# Patient Record
Sex: MALE | Race: WHITE | Employment: PART TIME | URBAN - METROPOLITAN AREA
[De-identification: names, ages, dates, MRNs, and addresses within clinical notes are randomized per-mention and may not be internally consistent; named-entity substitution may affect disease eponyms.]

---

## 2019-04-17 ENCOUNTER — ANESTHESIA EVENT (OUTPATIENT)
Dept: ENDOSCOPY | Age: 79
End: 2019-04-17
Payer: MEDICARE

## 2019-04-17 ENCOUNTER — HOSPITAL ENCOUNTER (OUTPATIENT)
Age: 79
Setting detail: OUTPATIENT SURGERY
Discharge: HOME | End: 2019-04-17
Attending: INTERNAL MEDICINE | Admitting: INTERNAL MEDICINE
Payer: MEDICARE

## 2019-04-17 ENCOUNTER — ANESTHESIA (OUTPATIENT)
Dept: ENDOSCOPY | Age: 79
End: 2019-04-17
Payer: MEDICARE

## 2019-04-17 VITALS
OXYGEN SATURATION: 96 % | DIASTOLIC BLOOD PRESSURE: 68 MMHG | WEIGHT: 164.3 LBS | HEIGHT: 67 IN | BODY MASS INDEX: 25.79 KG/M2 | RESPIRATION RATE: 16 BRPM | SYSTOLIC BLOOD PRESSURE: 112 MMHG | TEMPERATURE: 97.4 F | HEART RATE: 62 BPM

## 2019-04-17 VITALS
DIASTOLIC BLOOD PRESSURE: 84 MMHG | SYSTOLIC BLOOD PRESSURE: 171 MMHG | OXYGEN SATURATION: 92 % | RESPIRATION RATE: 13 BRPM | TEMPERATURE: 98.6 F

## 2019-04-17 PROCEDURE — 7100000000 HC PACU RECOVERY - FIRST 15 MIN: Performed by: INTERNAL MEDICINE

## 2019-04-17 PROCEDURE — 3700000000 HC ANESTHESIA ATTENDED CARE: Performed by: INTERNAL MEDICINE

## 2019-04-17 PROCEDURE — 88305 TISSUE EXAM BY PATHOLOGIST: CPT

## 2019-04-17 PROCEDURE — 7100000001 HC PACU RECOVERY - ADDTL 15 MIN: Performed by: INTERNAL MEDICINE

## 2019-04-17 PROCEDURE — 7100000010 HC PHASE II RECOVERY - FIRST 15 MIN: Performed by: INTERNAL MEDICINE

## 2019-04-17 PROCEDURE — 87081 CULTURE SCREEN ONLY: CPT

## 2019-04-17 PROCEDURE — 7100000011 HC PHASE II RECOVERY - ADDTL 15 MIN: Performed by: INTERNAL MEDICINE

## 2019-04-17 PROCEDURE — 2709999900 HC NON-CHARGEABLE SUPPLY: Performed by: INTERNAL MEDICINE

## 2019-04-17 PROCEDURE — 3609012400 HC EGD TRANSORAL BIOPSY SINGLE/MULTIPLE: Performed by: INTERNAL MEDICINE

## 2019-04-17 PROCEDURE — 6360000002 HC RX W HCPCS: Performed by: NURSE ANESTHETIST, CERTIFIED REGISTERED

## 2019-04-17 PROCEDURE — 3609013500 HC EGD REMOVAL TUMOR POLYP/OTHER LESION SNARE TECH: Performed by: INTERNAL MEDICINE

## 2019-04-17 PROCEDURE — 2580000003 HC RX 258: Performed by: ANESTHESIOLOGY

## 2019-04-17 PROCEDURE — 2500000003 HC RX 250 WO HCPCS: Performed by: NURSE ANESTHETIST, CERTIFIED REGISTERED

## 2019-04-17 PROCEDURE — 3609012700 HC EGD DILATION SAVORY: Performed by: INTERNAL MEDICINE

## 2019-04-17 PROCEDURE — 3700000001 HC ADD 15 MINUTES (ANESTHESIA): Performed by: INTERNAL MEDICINE

## 2019-04-17 PROCEDURE — 2720000010 HC SURG SUPPLY STERILE: Performed by: INTERNAL MEDICINE

## 2019-04-17 RX ORDER — SODIUM CHLORIDE 0.9 % (FLUSH) 0.9 %
10 SYRINGE (ML) INJECTION PRN
Status: DISCONTINUED | OUTPATIENT
Start: 2019-04-17 | End: 2019-04-17 | Stop reason: HOSPADM

## 2019-04-17 RX ORDER — MIDAZOLAM HYDROCHLORIDE 1 MG/ML
INJECTION INTRAMUSCULAR; INTRAVENOUS PRN
Status: DISCONTINUED | OUTPATIENT
Start: 2019-04-17 | End: 2019-04-17 | Stop reason: SDUPTHER

## 2019-04-17 RX ORDER — SODIUM CHLORIDE 0.9 % (FLUSH) 0.9 %
10 SYRINGE (ML) INJECTION EVERY 12 HOURS SCHEDULED
Status: DISCONTINUED | OUTPATIENT
Start: 2019-04-17 | End: 2019-04-17 | Stop reason: HOSPADM

## 2019-04-17 RX ORDER — PROPOFOL 10 MG/ML
INJECTION, EMULSION INTRAVENOUS PRN
Status: DISCONTINUED | OUTPATIENT
Start: 2019-04-17 | End: 2019-04-17 | Stop reason: SDUPTHER

## 2019-04-17 RX ORDER — FENTANYL CITRATE 50 UG/ML
INJECTION, SOLUTION INTRAMUSCULAR; INTRAVENOUS PRN
Status: DISCONTINUED | OUTPATIENT
Start: 2019-04-17 | End: 2019-04-17 | Stop reason: SDUPTHER

## 2019-04-17 RX ORDER — SODIUM CHLORIDE, SODIUM LACTATE, POTASSIUM CHLORIDE, CALCIUM CHLORIDE 600; 310; 30; 20 MG/100ML; MG/100ML; MG/100ML; MG/100ML
INJECTION, SOLUTION INTRAVENOUS CONTINUOUS
Status: DISCONTINUED | OUTPATIENT
Start: 2019-04-17 | End: 2019-04-17 | Stop reason: HOSPADM

## 2019-04-17 RX ORDER — LIDOCAINE HYDROCHLORIDE 20 MG/ML
INJECTION, SOLUTION INFILTRATION; PERINEURAL PRN
Status: DISCONTINUED | OUTPATIENT
Start: 2019-04-17 | End: 2019-04-17 | Stop reason: SDUPTHER

## 2019-04-17 RX ADMIN — MIDAZOLAM 2 MG: 1 INJECTION INTRAMUSCULAR; INTRAVENOUS at 07:22

## 2019-04-17 RX ADMIN — PROPOFOL 50 MG: 10 INJECTION, EMULSION INTRAVENOUS at 07:25

## 2019-04-17 RX ADMIN — FENTANYL CITRATE 50 MCG: 50 INJECTION, SOLUTION INTRAMUSCULAR; INTRAVENOUS at 07:24

## 2019-04-17 RX ADMIN — PROPOFOL 70 MG: 10 INJECTION, EMULSION INTRAVENOUS at 07:29

## 2019-04-17 RX ADMIN — FENTANYL CITRATE 50 MCG: 50 INJECTION, SOLUTION INTRAMUSCULAR; INTRAVENOUS at 07:22

## 2019-04-17 RX ADMIN — LIDOCAINE HYDROCHLORIDE 10 MG: 20 INJECTION, SOLUTION INFILTRATION; PERINEURAL at 07:24

## 2019-04-17 RX ADMIN — SODIUM CHLORIDE, POTASSIUM CHLORIDE, SODIUM LACTATE AND CALCIUM CHLORIDE: 600; 310; 30; 20 INJECTION, SOLUTION INTRAVENOUS at 06:28

## 2019-04-17 RX ADMIN — PROPOFOL 20 MG: 10 INJECTION, EMULSION INTRAVENOUS at 07:24

## 2019-04-17 RX ADMIN — PROPOFOL 30 MG: 10 INJECTION, EMULSION INTRAVENOUS at 07:41

## 2019-04-17 ASSESSMENT — PAIN - FUNCTIONAL ASSESSMENT: PAIN_FUNCTIONAL_ASSESSMENT: 0-10

## 2019-04-17 ASSESSMENT — PAIN SCALES - GENERAL
PAINLEVEL_OUTOF10: 0

## 2019-04-17 NOTE — PROGRESS NOTES
Slight difficulty with esophageal intubation, some slight stridor upon awakening, patient sent to PACU per CRNA. [facility] notified.
nail polish on your fingers or toes. 11. DO NOT wear any jewelry or piercings on day of surgery. All body piercing jewelry must be removed. 12. Shower the night before surgery with _x__Antibacterial soap /PHAN WIPES________    13. TOTAL JOINT REPLACEMENT/HYSTERECTOMY PATIENTS ONLY---Remember to bring Blood Bank bracelet to the hospital on the day of surgery. 14. If you have a Living Will and Durable Power of  for Healthcare, please bring in a copy. 15. If appropriate bring crutches, inspirex, WALKER, CANE etc... 12. Notify your Surgeon if you develop any illness between now and surgery time, cough, cold, fever, sore throat, nausea, vomiting, etc.  Please notify your surgeon if you experience dizziness, shortness of breath or blurred vision between now & the time of your surgery. 17. If you have ___dentures, they will be removed before going to the OR; we will provide you a container. If you wear ___contact lenses or ___glasses, they will be removed; please bring a case for them. 18. To provide excellent care visitors will be limited to 2 in the room at any given time. 19. Please bring picture ID and insurance card. 20. Sleep apnea patients need to bring CPAP AND SETTINGS to hospital on day of surgery. 21. During flu season no children under the age of 15 are permitted in the hospital for the safety of all patients. 22. Other                 Please call AMBULATORY CARE if you have any further questions.    [address] [facility]     [address]

## 2019-04-17 NOTE — H&P
H&p reviewed. No changes. Blood pressure (!) 143/67, pulse 58, temperature 96 °F (35.6 °C), temperature source Temporal, resp. rate 16, height 5' 7\" (1.702 m), weight 164 lb 4.8 oz (74.5 kg), SpO2 97 %.

## 2019-04-17 NOTE — ANESTHESIA PRE PROCEDURE
Department of Anesthesiology  Preprocedure Note       Name:  Shauna Tirado   Age:  70 y.o.  :  1940                                          MRN:  [id number]         Date:  2019      Surgeon: Abdirizak Dan):  Javad Astorga MD    Procedure: EGD ESOPHAGOGASTRODUODENOSCOPY (N/A )    Medications prior to admission:   Prior to Admission medications    Not on File       Current medications:    Current Facility-Administered Medications   Medication Dose Route Frequency Provider Last Rate Last Dose    sodium chloride flush 0.9 % injection 10 mL  10 mL Intravenous 2 times per day Javad Astorga MD        sodium chloride flush 0.9 % injection 10 mL  10 mL Intravenous PRN Javad Astorga MD        lactated ringers infusion   Intravenous Continuous Iftikhar Wheatley MD 10 mL/hr at 19 5931         Allergies:  No Known Allergies    Problem List:    Patient Active Problem List   Diagnosis Code    Right cataract H26.9       Past Medical History:        Diagnosis Date    Gout     Torn rotator cuff 2008? left       Past Surgical History:        Procedure Laterality Date    CATARACT REMOVAL WITH IMPLANT Right 2016    COLONOSCOPY      neg    FRACTURE SURGERY Left     thumb    TONSILLECTOMY      UPPER GASTROINTESTINAL ENDOSCOPY      small hiatal hernia       Social History:    Social History     Tobacco Use    Smoking status: Never Smoker    Smokeless tobacco: Never Used    Tobacco comment: smoked cigars 20 years ago on occasion   Substance Use Topics    Alcohol use:  Yes     Alcohol/week: 2.4 oz     Types: 4 Glasses of wine per week     Comment: Daily wine                                Counseling given: Not Answered  Comment: smoked cigars 20 years ago on occasion      Vital Signs (Current):   Vitals:    19 0935 19 0610   BP:  (!) 143/67   Pulse:  58   Resp:  16   Temp:  96 °F (35.6 °C)   TempSrc:  Temporal   SpO2:  97%   Weight: 160 lb (72.6 kg) 164 lb 4.8 oz (74.5 kg)   Height: 5' 7\" (1.702 m) 5' 7\" (1.702 m)                                              BP Readings from Last 3 Encounters:   04/17/19 (!) 143/67   06/06/17 (!) 177/56   05/03/17 (!) 156/76       NPO Status: Time of last liquid consumption: 2100                        Time of last solid consumption: 1900                        Date of last liquid consumption: 04/16/19                        Date of last solid food consumption: 04/16/19    BMI:   Wt Readings from Last 3 Encounters:   04/17/19 164 lb 4.8 oz (74.5 kg)   06/06/17 170 lb (77.1 kg)   05/03/17 174 lb 3 oz (79 kg)     Body mass index is 25.73 kg/m². CBC:   Lab Results   Component Value Date    WBC 4.3 11/21/2014    RBC 4.16 11/21/2014    HGB 13.7 11/21/2014    HCT 40.9 11/21/2014    MCV 98.4 11/21/2014    RDW 14.7 11/21/2014     11/21/2014       CMP:   Lab Results   Component Value Date     11/21/2014    K 4.1 11/21/2014     11/21/2014    CO2 22 11/21/2014    BUN 10 11/21/2014    CREATININE 1.0 11/21/2014    GFRAA >60 11/21/2014    LABGLOM >60 11/21/2014    GLUCOSE 93 11/21/2014    GLUCOSE 85 04/01/2011    PROT 6.7 11/21/2014    CALCIUM 9.0 11/21/2014    BILITOT 0.2 11/21/2014    ALKPHOS 72 11/21/2014    AST 17 11/21/2014    ALT 11 11/21/2014       POC Tests: No results for input(s): POCGLU, POCNA, POCK, POCCL, POCBUN, POCHEMO, POCHCT in the last 72 hours.     Coags: No results found for: PROTIME, INR, APTT    HCG (If Applicable): No results found for: PREGTESTUR, PREGSERUM, HCG, HCGQUANT     ABGs: No results found for: PHART, PO2ART, EYW8NET, NRL2AMD, BEART, U1RRFCPW     Type & Screen (If Applicable):  No results found for: LABABO, [facility]    Anesthesia Evaluation  Patient summary reviewed no history of anesthetic complications:   Airway: Mallampati: II  TM distance: >3 FB   Neck ROM: full  Mouth opening: > = 3 FB Dental: normal exam         Pulmonary:Negative Pulmonary ROS breath sounds clear to

## 2019-04-17 NOTE — OP NOTE
[address]                                OPERATIVE REPORT    PATIENT NAME: Aimee Sotomayor                    :        1940  MED REC NO:   [id number]                            ROOM:  ACCOUNT NO:   [de-identified]                           ADMIT DATE: 2019  PROVIDER:     Kishan Mcelroy MD    DATE OF PROCEDURE:  2019    PREOPERATIVE DIAGNOSIS:  Dysphagia. POSTOPERATIVE DIAGNOSES:  GERD, gastritis. OPERATION PERFORMED:  EGD, biopsies, esophageal dilatation. SURGEON:  Kishan Mcelroy MD    INDICATIONS:  The patient is 61-year-old. Had difficulty with  swallowing. ASA classification II. Informed consent. SEDATION:  By Anesthesia. OPERATIVE PROCEDURE:  The Olympus video upper endoscope was introduced  through the mouth. Difficult intubation through the hypopharynx. The  esophagus, the stomach, and the proximal duodenum were inspected. Exam  of the fundus by retroflexion. Hiatus hernia with erosive mucosal  changes in the lower esophagus, biopsies, CLOtest for H. pylori. Multiple acute ulcers in the gastric antrum. Biopsies, guidewire, a  small polyp in the upper stomach, excised with the forceps. Guidewire  passed through the endoscope into the stomach. Endoscope withdrawn. Wire in place and the Savary dilator size 16 mm was advanced over the  wire and the esophagus was dilated. Dilator and wire were withdrawn  together. Secretions suctioned. Shaji Clinton MD    D: 2019 7:52:28       T: 2019 8:49:25     [email or web address]  Job#: [id number]     Doc#: [id number]    CC:   Kamila Morse DO

## 2019-04-17 NOTE — ANESTHESIA POSTPROCEDURE EVALUATION
Department of Anesthesiology  Postprocedure Note    Patient: He Blair  MRN: [id number]  YOB: 1940  Date of evaluation: 4/17/2019  Time:  11:29 AM     Procedure Summary     Date:  04/17/19 Room / Location:  [address] ENDOSCOPY    Anesthesia Start:  3975 Anesthesia Stop:      Procedures:       EGD DILATION SAVORY (N/A )      EGD BIOPSY      EGD POLYP SNARE Diagnosis:  (DYSPHAGIA)    Surgeon:  Sola Magaña MD Responsible Provider:  Marquis Coleman MD    Anesthesia Type:  MAC ASA Status:  2          Anesthesia Type: MAC    Joo Phase I: Joo Score: 10    Joo Phase II: Joo Score: 10    Last vitals: Reviewed and per EMR flowsheets.        Anesthesia Post Evaluation    Patient location during evaluation: PACU  Patient participation: complete - patient participated  Level of consciousness: awake and alert  Airway patency: patent  Nausea & Vomiting: no vomiting and no nausea  Complications: no  Cardiovascular status: hemodynamically stable  Respiratory status: acceptable  Hydration status: stable

## 2019-04-18 LAB — CLOTEST: NORMAL

## 2020-02-13 LAB
TSH SERPL DL<=0.05 MIU/L-ACNC: NORMAL M[IU]/L
URIC ACID: NORMAL

## 2021-02-02 LAB
ALBUMIN SERPL-MCNC: NORMAL G/DL
ALP BLD-CCNC: NORMAL U/L
ALT SERPL-CCNC: NORMAL U/L
ANION GAP SERPL CALCULATED.3IONS-SCNC: NORMAL MMOL/L
AST SERPL-CCNC: NORMAL U/L
BILIRUB SERPL-MCNC: NORMAL MG/DL
BUN BLDV-MCNC: NORMAL MG/DL
CALCIUM SERPL-MCNC: NORMAL MG/DL
CHLORIDE BLD-SCNC: NORMAL MMOL/L
CO2: NORMAL
CREAT SERPL-MCNC: NORMAL MG/DL
GFR CALCULATED: NORMAL
GLUCOSE BLD-MCNC: NORMAL MG/DL
LIPASE: NORMAL
POTASSIUM SERPL-SCNC: NORMAL MMOL/L
SODIUM BLD-SCNC: NORMAL MMOL/L
TOTAL PROTEIN: NORMAL
URIC ACID: NORMAL

## 2021-02-05 ENCOUNTER — APPOINTMENT (OUTPATIENT)
Dept: INTERVENTIONAL RADIOLOGY/VASCULAR | Age: 81
DRG: 314 | End: 2021-02-05
Payer: MEDICARE

## 2021-02-05 ENCOUNTER — APPOINTMENT (OUTPATIENT)
Dept: GENERAL RADIOLOGY | Age: 81
DRG: 314 | End: 2021-02-05
Payer: MEDICARE

## 2021-02-05 ENCOUNTER — HOSPITAL ENCOUNTER (INPATIENT)
Age: 81
LOS: 4 days | Discharge: ANOTHER HOSPITAL | DRG: 314 | End: 2021-02-09
Attending: EMERGENCY MEDICINE | Admitting: INTERNAL MEDICINE
Payer: MEDICARE

## 2021-02-05 ENCOUNTER — APPOINTMENT (OUTPATIENT)
Dept: CT IMAGING | Age: 81
DRG: 314 | End: 2021-02-05
Payer: MEDICARE

## 2021-02-05 DIAGNOSIS — F41.9 ANXIETY: ICD-10-CM

## 2021-02-05 DIAGNOSIS — J90 PLEURAL EFFUSION, BILATERAL: Primary | ICD-10-CM

## 2021-02-05 DIAGNOSIS — R09.02 HYPOXIA: ICD-10-CM

## 2021-02-05 LAB
ALBUMIN FLUID: 1.1 G/DL
ALBUMIN SERPL-MCNC: 3.4 G/DL (ref 3.5–5.2)
ALP BLD-CCNC: 73 U/L (ref 40–129)
ALT SERPL-CCNC: 29 U/L (ref 0–40)
AMMONIA: 17 UMOL/L (ref 16–60)
AMYLASE FLUID: 16 U/L
ANION GAP SERPL CALCULATED.3IONS-SCNC: 10 MMOL/L (ref 7–16)
APPEARANCE FLUID: NORMAL
AST SERPL-CCNC: 25 U/L (ref 0–39)
BACTERIA: NORMAL /HPF
BILIRUB SERPL-MCNC: 0.5 MG/DL (ref 0–1.2)
BILIRUBIN DIRECT: <0.2 MG/DL (ref 0–0.3)
BILIRUBIN URINE: NEGATIVE
BILIRUBIN, INDIRECT: ABNORMAL MG/DL (ref 0–1)
BLOOD, URINE: ABNORMAL
BUN BLDV-MCNC: 24 MG/DL (ref 8–23)
CALCIUM SERPL-MCNC: 9.2 MG/DL (ref 8.6–10.2)
CELL COUNT FLUID TYPE: NORMAL
CHLORIDE BLD-SCNC: 108 MMOL/L (ref 98–107)
CLARITY: CLEAR
CO2: 23 MMOL/L (ref 22–29)
COLOR FLUID: YELLOW
COLOR: YELLOW
CREAT SERPL-MCNC: 1.2 MG/DL (ref 0.7–1.2)
EKG ATRIAL RATE: 78 BPM
EKG P AXIS: 38 DEGREES
EKG P-R INTERVAL: 160 MS
EKG Q-T INTERVAL: 414 MS
EKG QRS DURATION: 116 MS
EKG QTC CALCULATION (BAZETT): 471 MS
EKG R AXIS: -56 DEGREES
EKG T AXIS: 82 DEGREES
EKG VENTRICULAR RATE: 78 BPM
EPITHELIAL CELLS, UA: NORMAL /HPF
FLUID TYPE: NORMAL
GFR AFRICAN AMERICAN: >60
GFR NON-AFRICAN AMERICAN: 59 ML/MIN/1.73
GLUCOSE BLD-MCNC: 114 MG/DL (ref 74–99)
GLUCOSE URINE: NEGATIVE MG/DL
GLUCOSE, FLUID: 117 MG/DL
HCT VFR BLD CALC: 45.6 % (ref 37–54)
HEMOGLOBIN: 14.7 G/DL (ref 12.5–16.5)
INFLUENZA A BY PCR: NOT DETECTED
INFLUENZA B BY PCR: NOT DETECTED
KETONES, URINE: NEGATIVE MG/DL
LACTIC ACID: 2.1 MMOL/L (ref 0.5–2.2)
LD, FLUID: 54 U/L
LEUKOCYTE ESTERASE, URINE: NEGATIVE
MCH RBC QN AUTO: 32.5 PG (ref 26–35)
MCHC RBC AUTO-ENTMCNC: 32.2 % (ref 32–34.5)
MCV RBC AUTO: 100.7 FL (ref 80–99.9)
MONOCYTE, FLUID: 83 %
NEUTROPHIL, FLUID: 17 %
NITRITE, URINE: NEGATIVE
NUCLEATED CELLS FLUID: 239 /UL
PDW BLD-RTO: 14 FL (ref 11.5–15)
PH UA: 5 (ref 5–9)
PLATELET # BLD: 225 E9/L (ref 130–450)
PMV BLD AUTO: 11.2 FL (ref 7–12)
POTASSIUM SERPL-SCNC: 5.4 MMOL/L (ref 3.5–5)
PRO-BNP: ABNORMAL PG/ML (ref 0–450)
PROTEIN FLUID: 1.4 G/DL
PROTEIN UA: NEGATIVE MG/DL
RBC # BLD: 4.53 E12/L (ref 3.8–5.8)
RBC FLUID: <2000 /UL
RBC UA: NORMAL /HPF (ref 0–2)
REASON FOR REJECTION: NORMAL
REJECTED TEST: NORMAL
SARS-COV-2, NAAT: NOT DETECTED
SODIUM BLD-SCNC: 141 MMOL/L (ref 132–146)
SPECIFIC GRAVITY UA: 1.01 (ref 1–1.03)
TOTAL PROTEIN: 5.7 G/DL (ref 6.4–8.3)
TROPONIN: <0.01 NG/ML (ref 0–0.03)
UROBILINOGEN, URINE: 0.2 E.U./DL
WBC # BLD: 8.1 E9/L (ref 4.5–11.5)
WBC UA: NORMAL /HPF (ref 0–5)

## 2021-02-05 PROCEDURE — 93005 ELECTROCARDIOGRAM TRACING: CPT | Performed by: EMERGENCY MEDICINE

## 2021-02-05 PROCEDURE — 83605 ASSAY OF LACTIC ACID: CPT

## 2021-02-05 PROCEDURE — 80048 BASIC METABOLIC PNL TOTAL CA: CPT

## 2021-02-05 PROCEDURE — 6360000004 HC RX CONTRAST MEDICATION: Performed by: RADIOLOGY

## 2021-02-05 PROCEDURE — 83880 ASSAY OF NATRIURETIC PEPTIDE: CPT

## 2021-02-05 PROCEDURE — 71045 X-RAY EXAM CHEST 1 VIEW: CPT

## 2021-02-05 PROCEDURE — 80076 HEPATIC FUNCTION PANEL: CPT

## 2021-02-05 PROCEDURE — 87040 BLOOD CULTURE FOR BACTERIA: CPT

## 2021-02-05 PROCEDURE — 2580000003 HC RX 258: Performed by: INTERNAL MEDICINE

## 2021-02-05 PROCEDURE — 82150 ASSAY OF AMYLASE: CPT

## 2021-02-05 PROCEDURE — 36415 COLL VENOUS BLD VENIPUNCTURE: CPT

## 2021-02-05 PROCEDURE — 94640 AIRWAY INHALATION TREATMENT: CPT

## 2021-02-05 PROCEDURE — 82947 ASSAY GLUCOSE BLOOD QUANT: CPT

## 2021-02-05 PROCEDURE — 0W993ZZ DRAINAGE OF RIGHT PLEURAL CAVITY, PERCUTANEOUS APPROACH: ICD-10-PCS | Performed by: RADIOLOGY

## 2021-02-05 PROCEDURE — 85027 COMPLETE CBC AUTOMATED: CPT

## 2021-02-05 PROCEDURE — 82140 ASSAY OF AMMONIA: CPT

## 2021-02-05 PROCEDURE — 6370000000 HC RX 637 (ALT 250 FOR IP): Performed by: EMERGENCY MEDICINE

## 2021-02-05 PROCEDURE — 6370000000 HC RX 637 (ALT 250 FOR IP): Performed by: INTERNAL MEDICINE

## 2021-02-05 PROCEDURE — U0002 COVID-19 LAB TEST NON-CDC: HCPCS

## 2021-02-05 PROCEDURE — 6360000002 HC RX W HCPCS: Performed by: INTERNAL MEDICINE

## 2021-02-05 PROCEDURE — 82042 OTHER SOURCE ALBUMIN QUAN EA: CPT

## 2021-02-05 PROCEDURE — 86140 C-REACTIVE PROTEIN: CPT

## 2021-02-05 PROCEDURE — 87502 INFLUENZA DNA AMP PROBE: CPT

## 2021-02-05 PROCEDURE — 1200000000 HC SEMI PRIVATE

## 2021-02-05 PROCEDURE — 84484 ASSAY OF TROPONIN QUANT: CPT

## 2021-02-05 PROCEDURE — 32555 ASPIRATE PLEURA W/ IMAGING: CPT

## 2021-02-05 PROCEDURE — 70450 CT HEAD/BRAIN W/O DYE: CPT

## 2021-02-05 PROCEDURE — 88305 TISSUE EXAM BY PATHOLOGIST: CPT

## 2021-02-05 PROCEDURE — 2700000000 HC OXYGEN THERAPY PER DAY

## 2021-02-05 PROCEDURE — 84157 ASSAY OF PROTEIN OTHER: CPT

## 2021-02-05 PROCEDURE — 71275 CT ANGIOGRAPHY CHEST: CPT

## 2021-02-05 PROCEDURE — 99285 EMERGENCY DEPT VISIT HI MDM: CPT

## 2021-02-05 PROCEDURE — 88112 CYTOPATH CELL ENHANCE TECH: CPT

## 2021-02-05 PROCEDURE — 89051 BODY FLUID CELL COUNT: CPT

## 2021-02-05 PROCEDURE — 81001 URINALYSIS AUTO W/SCOPE: CPT

## 2021-02-05 PROCEDURE — C1729 CATH, DRAINAGE: HCPCS

## 2021-02-05 PROCEDURE — 83615 LACTATE (LD) (LDH) ENZYME: CPT

## 2021-02-05 RX ORDER — IPRATROPIUM BROMIDE AND ALBUTEROL SULFATE 2.5; .5 MG/3ML; MG/3ML
1 SOLUTION RESPIRATORY (INHALATION)
Status: DISCONTINUED | OUTPATIENT
Start: 2021-02-05 | End: 2021-02-05

## 2021-02-05 RX ORDER — FUROSEMIDE 10 MG/ML
20 INJECTION INTRAMUSCULAR; INTRAVENOUS DAILY
Status: DISCONTINUED | OUTPATIENT
Start: 2021-02-05 | End: 2021-02-07

## 2021-02-05 RX ORDER — POLYETHYLENE GLYCOL 3350 17 G/17G
17 POWDER, FOR SOLUTION ORAL DAILY PRN
Status: DISCONTINUED | OUTPATIENT
Start: 2021-02-05 | End: 2021-02-09 | Stop reason: HOSPADM

## 2021-02-05 RX ORDER — ALLOPURINOL 100 MG/1
100 TABLET ORAL DAILY
Status: DISCONTINUED | OUTPATIENT
Start: 2021-02-05 | End: 2021-02-09 | Stop reason: HOSPADM

## 2021-02-05 RX ORDER — ACETAMINOPHEN 325 MG/1
650 TABLET ORAL EVERY 6 HOURS PRN
Status: DISCONTINUED | OUTPATIENT
Start: 2021-02-05 | End: 2021-02-09 | Stop reason: HOSPADM

## 2021-02-05 RX ORDER — ONDANSETRON 2 MG/ML
4 INJECTION INTRAMUSCULAR; INTRAVENOUS EVERY 6 HOURS PRN
Status: DISCONTINUED | OUTPATIENT
Start: 2021-02-05 | End: 2021-02-09 | Stop reason: HOSPADM

## 2021-02-05 RX ORDER — SODIUM CHLORIDE 0.9 % (FLUSH) 0.9 %
10 SYRINGE (ML) INJECTION EVERY 12 HOURS SCHEDULED
Status: DISCONTINUED | OUTPATIENT
Start: 2021-02-05 | End: 2021-02-09 | Stop reason: HOSPADM

## 2021-02-05 RX ORDER — ALLOPURINOL 100 MG/1
100 TABLET ORAL DAILY
COMMUNITY

## 2021-02-05 RX ORDER — GAUZE BANDAGE 2" X 2"
100 BANDAGE TOPICAL DAILY
Status: DISCONTINUED | OUTPATIENT
Start: 2021-02-05 | End: 2021-02-09 | Stop reason: HOSPADM

## 2021-02-05 RX ORDER — 0.9 % SODIUM CHLORIDE 0.9 %
1000 INTRAVENOUS SOLUTION INTRAVENOUS ONCE
Status: DISCONTINUED | OUTPATIENT
Start: 2021-02-05 | End: 2021-02-09 | Stop reason: HOSPADM

## 2021-02-05 RX ORDER — ONDANSETRON 4 MG/1
4 TABLET, ORALLY DISINTEGRATING ORAL EVERY 8 HOURS PRN
Status: DISCONTINUED | OUTPATIENT
Start: 2021-02-05 | End: 2021-02-09 | Stop reason: HOSPADM

## 2021-02-05 RX ORDER — LORAZEPAM 0.5 MG/1
0.5 TABLET ORAL EVERY 4 HOURS PRN
Status: DISCONTINUED | OUTPATIENT
Start: 2021-02-05 | End: 2021-02-09 | Stop reason: HOSPADM

## 2021-02-05 RX ORDER — PANTOPRAZOLE SODIUM 40 MG/1
40 TABLET, DELAYED RELEASE ORAL
Status: DISCONTINUED | OUTPATIENT
Start: 2021-02-06 | End: 2021-02-09 | Stop reason: HOSPADM

## 2021-02-05 RX ORDER — IPRATROPIUM BROMIDE AND ALBUTEROL SULFATE 2.5; .5 MG/3ML; MG/3ML
1 SOLUTION RESPIRATORY (INHALATION) EVERY 4 HOURS
Status: DISCONTINUED | OUTPATIENT
Start: 2021-02-05 | End: 2021-02-09 | Stop reason: HOSPADM

## 2021-02-05 RX ORDER — SODIUM CHLORIDE 0.9 % (FLUSH) 0.9 %
10 SYRINGE (ML) INJECTION PRN
Status: DISCONTINUED | OUTPATIENT
Start: 2021-02-05 | End: 2021-02-09 | Stop reason: HOSPADM

## 2021-02-05 RX ORDER — ACETAMINOPHEN 650 MG/1
650 SUPPOSITORY RECTAL EVERY 6 HOURS PRN
Status: DISCONTINUED | OUTPATIENT
Start: 2021-02-05 | End: 2021-02-09 | Stop reason: HOSPADM

## 2021-02-05 RX ORDER — OMEPRAZOLE 10 MG/1
10 CAPSULE, DELAYED RELEASE ORAL DAILY
Status: ON HOLD | COMMUNITY
End: 2021-02-05

## 2021-02-05 RX ORDER — TAMSULOSIN HYDROCHLORIDE 0.4 MG/1
0.4 CAPSULE ORAL DAILY
Status: ON HOLD | COMMUNITY
End: 2021-02-05

## 2021-02-05 RX ADMIN — IPRATROPIUM BROMIDE AND ALBUTEROL SULFATE 1 AMPULE: .5; 3 SOLUTION RESPIRATORY (INHALATION) at 12:28

## 2021-02-05 RX ADMIN — ENOXAPARIN SODIUM 30 MG: 30 INJECTION SUBCUTANEOUS at 18:38

## 2021-02-05 RX ADMIN — FUROSEMIDE 20 MG: 10 INJECTION, SOLUTION INTRAMUSCULAR; INTRAVENOUS at 19:42

## 2021-02-05 RX ADMIN — IOPAMIDOL 75 ML: 755 INJECTION, SOLUTION INTRAVENOUS at 13:38

## 2021-02-05 RX ADMIN — SACUBITRIL AND VALSARTAN 1 TABLET: 24; 26 TABLET, FILM COATED ORAL at 19:49

## 2021-02-05 RX ADMIN — THIAMINE HCL TAB 100 MG 100 MG: 100 TAB at 21:32

## 2021-02-05 RX ADMIN — SODIUM CHLORIDE, PRESERVATIVE FREE 10 ML: 5 INJECTION INTRAVENOUS at 19:43

## 2021-02-05 RX ADMIN — ALLOPURINOL 100 MG: 100 TABLET ORAL at 18:38

## 2021-02-05 ASSESSMENT — ENCOUNTER SYMPTOMS
SHORTNESS OF BREATH: 1
COUGH: 1
CONSTIPATION: 0
NAUSEA: 0
ABDOMINAL PAIN: 0
DIARRHEA: 0
WHEEZING: 0
CHEST TIGHTNESS: 1
SORE THROAT: 0
VOMITING: 0

## 2021-02-05 NOTE — ED PROVIDER NOTES
Patient states he has been sick for the past 3 weeks intermittently with SOB. He states he was seen by his PCP and had labs drawn. He is a poor historian regarding the HPI. He denies chills, diaphoresis, congestion, S/T, n/v/d/c. He admits to chest tightness and SOB. He states there is no production    The history is provided by the patient. Shortness of Breath  Associated symptoms: cough    Associated symptoms: no abdominal pain, no chest pain, no diaphoresis, no sore throat, no vomiting and no wheezing        Review of Systems   Constitutional: Positive for activity change and fatigue. Negative for chills and diaphoresis. HENT: Negative for congestion and sore throat. Respiratory: Positive for cough, chest tightness and shortness of breath. Negative for wheezing. Cardiovascular: Negative for chest pain, palpitations and leg swelling. Gastrointestinal: Negative for abdominal pain, constipation, diarrhea, nausea and vomiting. Genitourinary: Negative. Musculoskeletal: Negative. Skin: Negative. Neurological: Negative. Physical Exam  Vitals signs and nursing note reviewed. Constitutional:       General: He is not in acute distress. Appearance: He is well-developed. He is ill-appearing. He is not toxic-appearing. HENT:      Head: Normocephalic and atraumatic. Mouth/Throat:      Mouth: Mucous membranes are moist.      Pharynx: Oropharynx is clear. No pharyngeal swelling. Eyes:      Pupils: Pupils are equal, round, and reactive to light. Neck:      Musculoskeletal: Normal range of motion and neck supple. Cardiovascular:      Rate and Rhythm: Normal rate and regular rhythm. Pulses: Normal pulses. No decreased pulses. Heart sounds: Normal heart sounds. No murmur. Pulmonary:      Effort: Tachypnea and respiratory distress present. Breath sounds: Examination of the right-upper field reveals decreased breath sounds.  Examination of the left-upper field reveals decreased breath sounds. Examination of the right-middle field reveals decreased breath sounds. Examination of the left-middle field reveals decreased breath sounds. Examination of the right-lower field reveals decreased breath sounds. Examination of the left-lower field reveals decreased breath sounds. Decreased breath sounds present. No wheezing or rales. Abdominal:      General: Bowel sounds are normal.      Palpations: Abdomen is soft. Tenderness: There is no abdominal tenderness. There is no guarding or rebound. Musculoskeletal: Normal range of motion. Right lower leg: He exhibits no tenderness. No edema. Left lower leg: He exhibits no tenderness. No edema. Skin:     General: Skin is warm and dry. Capillary Refill: Capillary refill takes less than 2 seconds. Coloration: Skin is not pale. Neurological:      General: No focal deficit present. Mental Status: He is alert and oriented to person, place, and time. Cranial Nerves: No cranial nerve deficit. Coordination: Coordination normal.   Psychiatric:         Mood and Affect: Mood normal.         Behavior: Behavior normal.         Procedures    [facility]    ED Course as of Feb 05 1441 Fri Feb 05, 2021   1257 I spoke with the patient's PCP. Patient is an alcoholic, drinking large amounts of wine daily. Patient's daughter told PCP that he has been more altered recently. 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  He was Covid negative.     [JS]      ED Course User Index  [JS] Jose Ocampo DO     EKG Interpretation    Interpreted by emergency department physician    Rhythm: normal sinus   Rate: normal  Axis: left  Ectopy: none  Conduction: normal  ST Segments: nonspecific changes  T Waves: inversion in  v2  Q Waves: II, III and aVf    Clinical Impression: no acute changes    Jose Ocampo  ED Course as of Feb 05 1441 Fri Feb 05, 2021 1257 I spoke with the patient's PCP. Patient is an alcoholic, drinking large amounts of wine daily. Patient's daughter told PCP that he has been more altered recently. 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  He was Covid negative. [JS]      ED Course User Index  [JS] Agus Gurrola, DO       --------------------------------------------- PAST HISTORY ---------------------------------------------  Past Medical History:  has a past medical history of Gout and Torn rotator cuff. Past Surgical History:  has a past surgical history that includes fracture surgery (Left, 1958); Tonsillectomy; Colonoscopy (2001); Upper gastrointestinal endoscopy (2001); Cataract removal with implant (Right, 11/02/2016); Upper gastrointestinal endoscopy (N/A, 4/17/2019); Upper gastrointestinal endoscopy (4/17/2019); and Upper gastrointestinal endoscopy (4/17/2019). Social History:  reports that he has never smoked. He has never used smokeless tobacco. He reports current alcohol use of about 4.0 standard drinks of alcohol per week. He reports that he does not use drugs. Family History: family history is not on file. The patients home medications have been reviewed. Allergies: Patient has no known allergies.     -------------------------------------------------- RESULTS -------------------------------------------------    Lab  Results for orders placed or performed during the hospital encounter of 02/05/21   Rapid influenza A/B antigens    Specimen: Nares   Result Value Ref Range    Influenza A by PCR Not Detected Not Detected    Influenza B by PCR Not Detected Not Detected   Basic metabolic panel   Result Value Ref Range    Sodium 141 132 - 146 mmol/L    Potassium 5.4 (H) 3.5 - 5.0 mmol/L    Chloride 108 (H) 98 - 107 mmol/L    CO2 23 22 - 29 mmol/L    Anion Gap 10 7 - 16 mmol/L    Glucose 114 (H) 74 - 99 mg/dL    BUN 24 (H) 8 - 23 mg/dL AM  ED Bed Assignment:  08/08    The nursing notes within the ED encounter and vital signs as below have been reviewed. Patient Vitals for the past 24 hrs:   BP Temp Pulse Resp SpO2 Weight   02/05/21 1359 (!) 128/93 -- 84 22 99 % --   02/05/21 1244 -- -- -- -- -- 165 lb (74.8 kg)   02/05/21 1228 -- -- -- 22 90 % --   02/05/21 1140 (!) 143/83 -- -- -- -- --   02/05/21 1138 -- -- -- 22 91 % --   02/05/21 1135 -- 96.9 °F (36.1 °C) 91 -- 98 % --       Oxygen Saturation Interpretation: Abnormal, improved on oxygen      ------------------------------------------ PROGRESS NOTES ------------------------------------------  Re-evaluation(s):  Time: 13:00. Patients symptoms show no change  Repeat physical examination is not changed    Time: 14:40. Patients symptoms show no change  Repeat physical examination is not changed    I have spoken with the patient and discussed todays results, in addition to providing specific details for the plan of care and counseling regarding the diagnosis and prognosis. Their questions are answered at this time and they are agreeable with the plan. Call placed to daughter, Ariana Palomo, message left for her to call.    --------------------------------- ADDITIONAL PROVIDER NOTES ---------------------------------  Consultations: We'll attempt to get patient over to interventional radiology for thoracentesis. Spoke with Dr. Ramirez Avina,  They will admit this patient. This patient's ED course included: a personal history and physicial examination and multiple bedside re-evaluations    This patient has been closely monitored during their ED course. Please note that the withdrawal or failure to initiate urgent interventions for this patient would likely result in a life threatening deterioration or permanent disability. Accordingly this patient received 0 minutes of critical care time, excluding separately billable procedures. Clinical Impression  1. Pleural effusion, bilateral    2. Hypoxia          Disposition  Patient's disposition: Admit to telemetry  Patient's condition is stable.        Emma Horn, DO  02/05/21 350 Laura Asher DO  02/05/21 150

## 2021-02-05 NOTE — ED NOTES
[phone]     Juan Carlos Echevarria, DEENA  02/05/21 1526

## 2021-02-05 NOTE — PROGRESS NOTES
Patient came down to Special Procedures for ultrasound guided right thoracentesis. 1619 Starting procedure 166/83 96 20 97% 3 liters nasal canula    1625 Ending procedure 124/98 92 20 100% 3 liters nasal canula    1500cc of yellow/straw color pleural fluid drained from patient, 2 x 2 and tegaderm applied to right upper back. Post procedure chest xray taken. Specimen sent with patient    Patient tolerated procedure.      Patient sent with ED

## 2021-02-06 LAB
ALBUMIN SERPL-MCNC: 3.6 G/DL (ref 3.5–5.2)
ALP BLD-CCNC: 74 U/L (ref 40–129)
ALT SERPL-CCNC: 25 U/L (ref 0–40)
ANION GAP SERPL CALCULATED.3IONS-SCNC: 11 MMOL/L (ref 7–16)
AST SERPL-CCNC: 24 U/L (ref 0–39)
BASOPHILS ABSOLUTE: 0.05 E9/L (ref 0–0.2)
BASOPHILS RELATIVE PERCENT: 0.5 % (ref 0–2)
BILIRUB SERPL-MCNC: 0.8 MG/DL (ref 0–1.2)
BUN BLDV-MCNC: 23 MG/DL (ref 8–23)
C-REACTIVE PROTEIN: 0.6 MG/DL (ref 0–0.4)
CALCIUM SERPL-MCNC: 9 MG/DL (ref 8.6–10.2)
CHLORIDE BLD-SCNC: 109 MMOL/L (ref 98–107)
CHOLESTEROL, TOTAL: 160 MG/DL (ref 0–199)
CO2: 23 MMOL/L (ref 22–29)
CREAT SERPL-MCNC: 1.3 MG/DL (ref 0.7–1.2)
EOSINOPHILS ABSOLUTE: 0.04 E9/L (ref 0.05–0.5)
EOSINOPHILS RELATIVE PERCENT: 0.4 % (ref 0–6)
GFR AFRICAN AMERICAN: >60
GFR NON-AFRICAN AMERICAN: 53 ML/MIN/1.73
GLUCOSE BLD-MCNC: 100 MG/DL (ref 74–99)
HBA1C MFR BLD: 5.7 % (ref 4–5.6)
HCT VFR BLD CALC: 45.2 % (ref 37–54)
HDLC SERPL-MCNC: 36 MG/DL
HEMOGLOBIN: 14.9 G/DL (ref 12.5–16.5)
IMMATURE GRANULOCYTES #: 0.02 E9/L
IMMATURE GRANULOCYTES %: 0.2 % (ref 0–5)
LDL CHOLESTEROL CALCULATED: 108 MG/DL (ref 0–99)
LYMPHOCYTES ABSOLUTE: 0.84 E9/L (ref 1.5–4)
LYMPHOCYTES RELATIVE PERCENT: 8.8 % (ref 20–42)
MAGNESIUM: 1.8 MG/DL (ref 1.6–2.6)
MCH RBC QN AUTO: 32.7 PG (ref 26–35)
MCHC RBC AUTO-ENTMCNC: 33 % (ref 32–34.5)
MCV RBC AUTO: 99.3 FL (ref 80–99.9)
MONOCYTES ABSOLUTE: 0.53 E9/L (ref 0.1–0.95)
MONOCYTES RELATIVE PERCENT: 5.6 % (ref 2–12)
NEUTROPHILS ABSOLUTE: 8.05 E9/L (ref 1.8–7.3)
NEUTROPHILS RELATIVE PERCENT: 84.5 % (ref 43–80)
PDW BLD-RTO: 13.8 FL (ref 11.5–15)
PHOSPHORUS: 3.5 MG/DL (ref 2.5–4.5)
PLATELET # BLD: 199 E9/L (ref 130–450)
PMV BLD AUTO: 11.4 FL (ref 7–12)
POTASSIUM SERPL-SCNC: 4.2 MMOL/L (ref 3.5–5)
PROSTATE SPECIFIC ANTIGEN: 1.14 NG/ML (ref 0–4)
RBC # BLD: 4.55 E12/L (ref 3.8–5.8)
SEDIMENTATION RATE, ERYTHROCYTE: 0 MM/HR (ref 0–15)
SODIUM BLD-SCNC: 143 MMOL/L (ref 132–146)
TOTAL PROTEIN: 5.8 G/DL (ref 6.4–8.3)
TRIGL SERPL-MCNC: 79 MG/DL (ref 0–149)
TSH SERPL DL<=0.05 MIU/L-ACNC: 1.76 UIU/ML (ref 0.27–4.2)
URIC ACID, SERUM: 9.8 MG/DL (ref 3.4–7)
VITAMIN D 25-HYDROXY: 30 NG/ML (ref 30–100)
VLDLC SERPL CALC-MCNC: 16 MG/DL
WBC # BLD: 9.5 E9/L (ref 4.5–11.5)

## 2021-02-06 PROCEDURE — 82607 VITAMIN B-12: CPT

## 2021-02-06 PROCEDURE — 94640 AIRWAY INHALATION TREATMENT: CPT

## 2021-02-06 PROCEDURE — 80053 COMPREHEN METABOLIC PANEL: CPT

## 2021-02-06 PROCEDURE — 84100 ASSAY OF PHOSPHORUS: CPT

## 2021-02-06 PROCEDURE — 2580000003 HC RX 258: Performed by: INTERNAL MEDICINE

## 2021-02-06 PROCEDURE — 86701 HIV-1ANTIBODY: CPT

## 2021-02-06 PROCEDURE — 85651 RBC SED RATE NONAUTOMATED: CPT

## 2021-02-06 PROCEDURE — 86702 HIV-2 ANTIBODY: CPT

## 2021-02-06 PROCEDURE — 80061 LIPID PANEL: CPT

## 2021-02-06 PROCEDURE — 86703 HIV-1/HIV-2 1 RESULT ANTBDY: CPT

## 2021-02-06 PROCEDURE — 82746 ASSAY OF FOLIC ACID SERUM: CPT

## 2021-02-06 PROCEDURE — 2700000000 HC OXYGEN THERAPY PER DAY

## 2021-02-06 PROCEDURE — 6370000000 HC RX 637 (ALT 250 FOR IP): Performed by: INTERNAL MEDICINE

## 2021-02-06 PROCEDURE — 83735 ASSAY OF MAGNESIUM: CPT

## 2021-02-06 PROCEDURE — 1200000000 HC SEMI PRIVATE

## 2021-02-06 PROCEDURE — 80074 ACUTE HEPATITIS PANEL: CPT

## 2021-02-06 PROCEDURE — 83036 HEMOGLOBIN GLYCOSYLATED A1C: CPT

## 2021-02-06 PROCEDURE — G0103 PSA SCREENING: HCPCS

## 2021-02-06 PROCEDURE — 36415 COLL VENOUS BLD VENIPUNCTURE: CPT

## 2021-02-06 PROCEDURE — 84550 ASSAY OF BLOOD/URIC ACID: CPT

## 2021-02-06 PROCEDURE — 82306 VITAMIN D 25 HYDROXY: CPT

## 2021-02-06 PROCEDURE — 85025 COMPLETE CBC W/AUTO DIFF WBC: CPT

## 2021-02-06 PROCEDURE — 99223 1ST HOSP IP/OBS HIGH 75: CPT | Performed by: INTERNAL MEDICINE

## 2021-02-06 PROCEDURE — 84443 ASSAY THYROID STIM HORMONE: CPT

## 2021-02-06 PROCEDURE — 6360000002 HC RX W HCPCS: Performed by: INTERNAL MEDICINE

## 2021-02-06 RX ORDER — METOPROLOL SUCCINATE 25 MG/1
25 TABLET, EXTENDED RELEASE ORAL DAILY
Status: DISCONTINUED | OUTPATIENT
Start: 2021-02-06 | End: 2021-02-09 | Stop reason: HOSPADM

## 2021-02-06 RX ORDER — COLCHICINE 0.6 MG/1
0.6 TABLET ORAL 2 TIMES DAILY
Status: DISCONTINUED | OUTPATIENT
Start: 2021-02-06 | End: 2021-02-09 | Stop reason: HOSPADM

## 2021-02-06 RX ADMIN — IPRATROPIUM BROMIDE AND ALBUTEROL SULFATE 1 AMPULE: .5; 3 SOLUTION RESPIRATORY (INHALATION) at 22:34

## 2021-02-06 RX ADMIN — METOPROLOL SUCCINATE 25 MG: 25 TABLET, EXTENDED RELEASE ORAL at 09:23

## 2021-02-06 RX ADMIN — SODIUM CHLORIDE, PRESERVATIVE FREE 10 ML: 5 INJECTION INTRAVENOUS at 22:26

## 2021-02-06 RX ADMIN — ALLOPURINOL 100 MG: 100 TABLET ORAL at 09:23

## 2021-02-06 RX ADMIN — FUROSEMIDE 20 MG: 10 INJECTION, SOLUTION INTRAMUSCULAR; INTRAVENOUS at 09:23

## 2021-02-06 RX ADMIN — ENOXAPARIN SODIUM 30 MG: 30 INJECTION SUBCUTANEOUS at 19:22

## 2021-02-06 RX ADMIN — THIAMINE HCL TAB 100 MG 100 MG: 100 TAB at 09:23

## 2021-02-06 RX ADMIN — LORAZEPAM 0.5 MG: 0.5 TABLET ORAL at 03:35

## 2021-02-06 RX ADMIN — IPRATROPIUM BROMIDE AND ALBUTEROL SULFATE 1 AMPULE: .5; 3 SOLUTION RESPIRATORY (INHALATION) at 06:10

## 2021-02-06 RX ADMIN — SODIUM CHLORIDE, PRESERVATIVE FREE 10 ML: 5 INJECTION INTRAVENOUS at 09:24

## 2021-02-06 RX ADMIN — IPRATROPIUM BROMIDE AND ALBUTEROL SULFATE 1 AMPULE: .5; 3 SOLUTION RESPIRATORY (INHALATION) at 09:27

## 2021-02-06 RX ADMIN — COLCHICINE 0.6 MG: 0.6 TABLET, FILM COATED ORAL at 22:26

## 2021-02-06 RX ADMIN — SACUBITRIL AND VALSARTAN 1 TABLET: 24; 26 TABLET, FILM COATED ORAL at 09:23

## 2021-02-06 RX ADMIN — PANTOPRAZOLE SODIUM 40 MG: 40 TABLET, DELAYED RELEASE ORAL at 06:10

## 2021-02-06 RX ADMIN — SACUBITRIL AND VALSARTAN 1 TABLET: 24; 26 TABLET, FILM COATED ORAL at 20:31

## 2021-02-06 RX ADMIN — IPRATROPIUM BROMIDE AND ALBUTEROL SULFATE 1 AMPULE: .5; 3 SOLUTION RESPIRATORY (INHALATION) at 13:19

## 2021-02-06 RX ADMIN — IPRATROPIUM BROMIDE AND ALBUTEROL SULFATE 1 AMPULE: .5; 3 SOLUTION RESPIRATORY (INHALATION) at 18:28

## 2021-02-06 ASSESSMENT — PAIN SCALES - GENERAL
PAINLEVEL_OUTOF10: 0
PAINLEVEL_OUTOF10: 0

## 2021-02-06 NOTE — H&P
[address]                              HISTORY AND PHYSICAL    PATIENT NAME: Jhon Sinclair                    :        1940  MED REC NO:   [id number]                            ROOM:       7593  ACCOUNT NO:   [de-identified]                           ADMIT DATE: 2021  PROVIDER:     Carson Mcgrath DO    CHIEF COMPLAINT AND HISTORY OF CHIEF COMPLAINT:  This is a pleasant  32-year-old white male who was admitted to [address] [place] [address]. The  patient presented to the hospital here through the emergency room on  2021. The patient states that approximately over the past three  weeks, he has been having intermittent shortness of breath and  difficulty breathing. The patient apparently presented to the hospital  here with increasing amount of shortness of breath. Radiographic  findings at this time did show moderate to large bilateral pleural  effusion with compressive atelectasis. The patient at this time was  seen and evaluated and admitted to the hospital under the service of Dr. Heber De Santiago and Dr. Betty Acuna. The patient was seen at this time. Exam at this  time revealed a 32-year-old white male who is alert and responsive,  resting relatively comfortable at the present time. From a cardiac  standpoint view, the patient currently denies any chest pain per se. He  does admit to have some exertional dyspnea. Radiographic findings at  this time did show abnormal CAT scan. The patient did undergo removal  of 1500 mL of fluid from his right lung. This was dark yellow. He  denies any prior cardiac history and his EKG did show a normal sinus  rhythm. He denies any prior cardiac history. He denies any swelling of  his lower extremities. He actually denies any orthopnea, but did have a  chronic nonproductive cough. He denies any fever or chills.   The only  thing, which is interesting to note at this time that he has recently  traveled to [place] for a conference. This was approximately three  weeks ago. He has been checked for COVID on several times, but so far  has been negative. His proBNP is elevated and he has no prior cardiac  history. Respiratory golden, he denies any fever or chills associated  with it. He does have a nonproductive cough. Gastrointestinal wise, he  denies any recent change in bowel habits, hematochezia, or rectal  bleeding, nausea, vomiting, diarrhea, or constipation. Genitourinary  wise, he does relate to having a frequency with urination and does have  nocturia x2 or 3. Otherwise, he denies any history of stroke, TIAs,  etc.  He did seem to be alert and responsive on my evaluation, although  in the emergency room they did think that he did have some underlying  memory impairment for which the CAT scan of the head was obtained. His  only other history which is significant is that he does drink wine on a  daily basis and two glasses which is approximately 8 to 10 ounces total.  He currently is improving at the present time. ALLERGIES:  He denies any known drug allergy. CURRENT MEDICATIONS:  Prior to admission include Zyloprim for gout and  occasional gastroesophageal reflux disease for which he has seen Dr. Nori Guzman. PAST MEDICAL HISTORY:  Positive for usual childhood diseases, history of  torn rotator cuff, history of gout. PAST SURGICAL HISTORY:  Includes cataract removal with implant,  colonoscopic exam, fracture surgery, tonsillectomy, and several  endoscopies. FAMILY HISTORY:  Parents are . Mother  of pancreatic  cancer. Father of old age. SOCIAL HISTORY:  Currently he is . He was interviewed in the  presence of his fiancePushpa. He is a father of two children. He still  works as a dentist.  The patient never smoked. Social use of alcohol,  one to two glasses of wine per day.     PHYSICAL EXAMINATION:  VITAL SIGNS:  Show height to be 5 feet 6 inches, weight is 164 pounds,  BMI 26.47. Blood pressure was 151/92, pulse 80, respirations 18. GENERAL APPEARANCE:  This is a pleasant 31-year-old white male who is  alert, responsive, oriented to person, place and time. HEENT:  Normal grossly to inspection. NECK:  With adequate upstrokes without bruits. Trachea midline. Thyroid not palpable. Neck revealed no shotty lymphadenopathy. LUNGS:  Diminished, predominantly on the right side. He did have  scattered rhonchi, did clear up with coughing. HEART:  Heart sounds were distant. I did not appreciate any significant  murmur. ABDOMEN:  Soft. No guarding, rebound, or rigidity. EXTREMITIES:  Without any cyanosis, clubbing, edema. BREASTS:  Normal male. IMPRESSION:  At this time includes:  1. Progressive shortness of breath with abnormal CAT scan showing large  bilateral pleural effusions with interstitial edema, possibly secondary  to cardiomyopathy. 2.  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 3.  Benign prostatic hypertrophy with nocturia x3 or 4.  4.  Hyperuricemia. 5.  History of gastroesophageal reflux disease. 6.  Possible cognitive impairment--history of alcohol use (wine)    PLAN AND RECOMMENDATIONS:  At this time, currently the patient does  appear to be stable. He underwent a thoracentesis at this time. Cytology and lab work is pending. We will proceed with further workup. I did suspect the possible cardiomyopathy at this time of undetermined  etiology. We will obtain consultation with [facility]. Echocardiogram will be obtained. We will check PSA. Continue to  diurese him. Pending further cytology study including lab, we would  dictate further care and treatment. We will caution him and observe for  any signs of DVTs.         [address],     D: 02/05/2021 18:51:08       T: 02/05/2021 21:30:31     EMILEE/LUIS [email or web address]  Job#: [id number]     Doc#: [id number]    CC:

## 2021-02-06 NOTE — PROGRESS NOTES
Internal Medicine Progress Note    [facility]    Stephen Pool. Leonard Baldwin., F.A.CMarelyONIKOLAI. Malvin Whittington D.O., CARMEN Webber, MSN, APRN, NP-C  Stephanie Holman. Vivienne Cavazos, MSN, APRN-CNP     Primary Care Physician: Ruslan Estrada DO   Admitting Physician:  Eryn Pennington DO  Admission date and time: 2/5/2021 11:42 AM    Room:  [facility]5139-85  Admitting diagnosis: Pleural effusion [J90]    Patient Name: Hang Mclaughlin  MRN: [id number]    Date of Service: 2/6/2021     Subjective:  Kristian Meng is a 68 y.o. male who was seen and examined today,2/6/2021, at the bedside. Patient seem more restless than he was yesterday. Currently wearing supplemental O2. Does have difficulty staying awake but states that he slept very poorly last evening. Denies any other complaints    Malcom Hillman, present during my examination. Review of System:   Constitutional:   Denies fever or chills, weight loss or gain, fatigue or malaise. HEENT:   Denies ear pain, sore throat, sinus or eye problems. Cardiovascular:   Denies any chest pain, irregular heartbeats, or palpitations. Respiratory:   Appears short of breath. Wearing oxygen  Gastrointestinal:   Denies nausea, vomiting, diarrhea, or constipation. Denies any abdominal pain. Genitourinary:    Admit to frequent urination  Extremities:   Denies lower extremity swelling, edema or cyanosis. Neurology:    Denies any headache or focal neurological deficits, admits to generalized weakness and appear to be has some cognitive delay   Psch:   Appears somewhat anxious  Musculoskeletal:    Denies  myalgias, joint complaints or back pain. Integumentary:   Denies any rashes, ulcers, or excoriations. Denies bruising. Hematologic/Lymphatic:  Denies bruising or bleeding.     Physical Exam:  I/O this shift:  In: 120 [P.O.:120]  Out: -     Intake/Output Summary (Last 24 hours) at 2/6/2021 1446  Last data filed at 2/6/2021 5560  Gross per 24 hour   Intake 120 ml   Output --   Net 120 ml   No intake/output data recorded. Patient Vitals for the past 96 hrs (Last 3 readings):   Weight   02/06/21 0559 160 lb 12.8 oz (72.9 kg)   02/05/21 1715 164 lb (74.4 kg)   02/05/21 1244 165 lb (74.8 kg)     Vital Signs:   Blood pressure 119/77, pulse 80, temperature 98 °F (36.7 °C), temperature source Oral, resp. rate 20, height 5' 6\" (1.676 m), weight 160 lb 12.8 oz (72.9 kg), SpO2 98 %. General appearance:  Alert, responsive, oriented to person, place, and time. Well preserved, alert, no distress. Head:  Normocephalic. No masses, lesions or tenderness. Eyes:  PERRLA. EOMI. Sclera clear. Buccal mucosa moist.  ENT:  Ears normal. Mucosa normal.  Wearing supplemental O2  Neck:    Supple. Trachea midline. No thyromegaly. No JVD. No bruits. Heart:    Rhythm regular. Rate controlled. No murmurs. Lungs:    Diminished at the base. Abdomen:   Soft. Non-tender. Non-distended. Bowel sounds positive. No organomegaly or masses. No pain on palpation. Extremities:    Peripheral pulses present. No peripheral edema. No ulcers. No cyanosis. No clubbing. Neurologic:    Alert x 3. No focal deficit. Cranial nerves grossly intact. No focal weakness. Psych:   Appears slightly agitated and restless. Musculoskeletal:   Spine ROM normal. Muscular strength intact. Gait not assessed. Integumentary:  No rashes  Skin normal color and texture.   Genitalia/Breast:  Deferred    Medication:  Scheduled Meds:   metoprolol succinate  25 mg Oral Daily    sodium chloride  1,000 mL Intravenous Once    sodium chloride flush  10 mL Intravenous 2 times per day    enoxaparin  30 mg Subcutaneous Daily    allopurinol  100 mg Oral Daily    pantoprazole  40 mg Oral QAM AC    furosemide  20 mg Intravenous Daily    sacubitril-valsartan  1 tablet Oral BID    ipratropium-albuterol  1 ampule Inhalation Q4H    thiamine mononitrate  100 mg Oral Daily     Continuous Infusions:    Objective Data:  CBC with Differential:    Lab Results   Component Value Date    WBC 9.5 02/06/2021    RBC 4.55 02/06/2021    HGB 14.9 02/06/2021    HCT 45.2 02/06/2021     02/06/2021    MCV 99.3 02/06/2021    MCH 32.7 02/06/2021    MCHC 33.0 02/06/2021    RDW 13.8 02/06/2021    SEGSPCT 71 11/21/2013    LYMPHOPCT 8.8 02/06/2021    MONOPCT 5.6 02/06/2021    BASOPCT 0.5 02/06/2021    MONOSABS 0.53 02/06/2021    LYMPHSABS 0.84 02/06/2021    EOSABS 0.04 02/06/2021    BASOSABS 0.05 02/06/2021     CMP:    Lab Results   Component Value Date     02/06/2021    K 4.2 02/06/2021     02/06/2021    CO2 23 02/06/2021    BUN 23 02/06/2021    CREATININE 1.3 02/06/2021    GFRAA >60 02/06/2021    LABGLOM 53 02/06/2021    GLUCOSE 100 02/06/2021    GLUCOSE 85 04/01/2011    PROT 5.8 02/06/2021    LABALBU 3.6 02/06/2021    LABALBU 4.1 04/01/2011    CALCIUM 9.0 02/06/2021    BILITOT 0.8 02/06/2021    ALKPHOS 74 02/06/2021    AST 24 02/06/2021    ALT 25 02/06/2021              Assessment:    · Progressive shortness of breath with abnormal CAT scan showing large bilateral pleural effusions with interstitial edema, possibly secondary to cardiomyopathy. · Suspected cardiomyopathy, possibly post viral, alcohol induced, rule out ischemic. · Benign prostatic hypertrophy with nocturia x3 or 4--PSA normal.  · Hyperuricemia. · History of gastroesophageal reflux disease. · Cognitive impairment possibly secondary to alcohol use versus other etiology such as acute delirium      Plan:     · I have discussed the condition with the Sanjuanita Ruiz. at the bedside. The fluid appeared to be a transudative process. We have instituted treatment for left ventricular systolic dysfunction  · Cardiology has been consulted but has not seen yet  · Echocardiogram has been ordered but not performed  · Will obtain arterial blood gases and pulmonary consult  · Cognitive impairment could be due to alcohol use.   Although there has been discrepancy on what he drank last.  We will continue vitamin supplementation and as needed anxiety medication. CT has been negative  · We will attempt to get private room so his fiancée can stay      More than 50% of my  time was spent at the bedside counseling/coordinating care with the patient and/or family with face to face contact. This time was spent reviewing notes and laboratory data as well as instructing and counseling the patient. Time I spent with the family or surrogate(s) is included only if the patient was incapable of providing the necessary information or participating in medical decisions. I also discussed the differential diagnosis and all of the proposed management plans with the patient and individuals accompanying the patient. Solitario Clark requires this high level of physician care and nursing on the Telemetry unit due the complexity of decision management and chance of rapid decline or death. Continued cardiac monitoring and higher level of nursing are required. I am readily available for any further decision-making and intervention.      Madelyn Vasquez DO, F.A.C.O.I.  2/6/2021  2:46 PM

## 2021-02-06 NOTE — PROGRESS NOTES
Daughter Juliane Hilario called in regarding to pt condition. Updated daughter on any changes. Daughter voiced understanding. Requested a call tomorrow after the doctor made rounds. Will pass information to day shift.

## 2021-02-06 NOTE — CONSULTS
[address]    Name: Milo Hills    Age: 68 y.o. Date of Admission: 2/5/2021 11:42 AM    Date of Service: 2/6/2021    Reason for Consultation: Cardiomyopathy, CHF    Referring Physician: Mandy Almazan DO    History of Present Illness:  Milo Hills is a 68 y.o. male (new to [facility]) who presented to [facility] on 2/5/2021 for further evaluation of progressive SOB for the past 1 month. His PMH is outlined in detail below (see A/P below). He is a sub-optimal historian. He denies recent chest pain, palpitations, syncope. +orthopnea prior to admission. Respiratory status improved s/p thoracentesis on admission. He is currently with no acute distress. SR on EKG and telemetry. Review of Systems:   Cardiac: As per HPI  General: No fever, chills  Pulmonary: As per HPI  HEENT: No visual disturbances, difficult swallowing  GI: No nausea, vomiting  : No dysuria, hematuria  Endocrine: No thyroid disease or DM  Musculoskeletal: HALE x 4, no focal motor deficits  Skin: Intact, no rashes  Neuro: No headache, seizures  Psych: Currently with no depression, anxiety    Past Medical History:  Past Medical History:   Diagnosis Date    Gout     Torn rotator cuff 2008?     left       Past Surgical History:  Past Surgical History:   Procedure Laterality Date    CATARACT REMOVAL WITH IMPLANT Right 11/02/2016    COLONOSCOPY  2001    neg    FRACTURE SURGERY Left 1958    thumb    TONSILLECTOMY      UPPER GASTROINTESTINAL ENDOSCOPY  2001    small hiatal hernia    UPPER GASTROINTESTINAL ENDOSCOPY N/A 4/17/2019    EGD DILATION SAVORY performed by Koki Redmond MD at [address]  4/17/2019    EGD BIOPSY performed by Koki Redmond MD at [address]  4/17/2019    EGD POLYP SNARE performed by Koki Redmond MD at [facility] ENDOSCOPY     Family History:  Non-contributory    Social History:  Social History Daily Raymundo Vasquez, DO   25 mg at 02/06/21 0923    0.9 % sodium chloride bolus  1,000 mL Intravenous Once [facility], DO        sodium chloride flush 0.9 % injection 10 mL  10 mL Intravenous 2 times per day Raymundocaden Vasquez, DO   10 mL at 02/06/21 1772    sodium chloride flush 0.9 % injection 10 mL  10 mL Intravenous PRN Grant Vasquez DO        polyethylene glycol (GLYCOLAX) packet 17 g  17 g Oral Daily PRN Raymundo Vasquez, DO        acetaminophen (TYLENOL) tablet 650 mg  650 mg Oral Q6H PRN Raymundo Vasquez, DO        Or    acetaminophen (TYLENOL) suppository 650 mg  650 mg Rectal Q6H PRN Grant Vasquez DO        ondansetron (ZOFRAN-ODT) disintegrating tablet 4 mg  4 mg Oral Q8H PRN Grant Vasquez DO        Or    ondansetron (ZOFRAN) injection 4 mg  4 mg Intravenous Q6H PRN Raymundo Vasquez, DO        enoxaparin (LOVENOX) injection 30 mg  30 mg Subcutaneous Daily Grant Vasquez DO   30 mg at 02/05/21 1838    allopurinol (ZYLOPRIM) tablet 100 mg  100 mg Oral Daily Grant Vasquez DO   100 mg at 02/06/21 8909    pantoprazole (PROTONIX) tablet 40 mg  40 mg Oral QAM AC Grant Vasquez DO   40 mg at 02/06/21 0610    perflutren lipid microspheres (DEFINITY) injection 1.65 mg  1.5 mL Intravenous ONCE PRN Raymundo Vasquez DO        furosemide (LASIX) injection 20 mg  20 mg Intravenous Daily Grant Vasquez DO   20 mg at 02/06/21 2735    sacubitril-valsartan (ENTRESTO) 24-26 MG per tablet 1 tablet  1 tablet Oral BID Mica Sherrell, DO   1 tablet at 02/06/21 0923    ipratropium-albuterol (DUONEB) nebulizer solution 1 ampule  1 ampule Inhalation Q4H Grant Vasquez DO   1 ampule at 02/06/21 1319    thiamine mononitrate tablet 100 mg  100 mg Oral Daily Grant Vasquez DO   100 mg at 02/06/21 5134    LORazepam (ATIVAN) tablet 0.5 mg  0.5 mg Oral Q4H PRN Raymundo Lacey Vasquez DO   0.5 mg at 02/06/21 0335         Physical Exam:  /77   Pulse 80   Temp 98 °F (36.7 °C) (Oral)   Resp 20   Ht 5' 6\" (1.676 m)   Wt 160 lb 12.8 oz (72.9 kg)   SpO2 98%   BMI 25.95 kg/m²   Wt Readings from Last 3 Encounters:   02/06/21 160 lb 12.8 oz (72.9 kg)   04/17/19 164 lb 4.8 oz (74.5 kg)   06/06/17 170 lb (77.1 kg)     Appearance: Awake, no acute respiratory distress  Skin: Intact, no rash  Head: Normocephalic, atraumatic  Eyes: EOMI, no conjunctival erythema  ENMT: No pharyngeal erythema, MMM, no rhinorrhea  Neck: Supple, no carotid bruits  Lungs: Decreased BS B/L, no wheezing  Cardiac: Regular rate and rhythm, no murmurs apparent  Abdomen: Soft, nontender, +bowel sounds  Extremities: Moves all extremities x 4, no lower extremity edema  Neurologic: No focal motor deficits apparent, normal mood and affect    Intake/Output:    Intake/Output Summary (Last 24 hours) at 2/6/2021 1540  Last data filed at 2/6/2021 0923  Gross per 24 hour   Intake 120 ml   Output --   Net 120 ml     No intake/output data recorded.     Laboratory Tests:  Recent Labs     02/05/21  1220 02/06/21  0735    143   K 5.4* 4.2   * 109*   CO2 23 23   BUN 24* 23   CREATININE 1.2 1.3*   GLUCOSE 114* 100*   CALCIUM 9.2 9.0     Lab Results   Component Value Date    MG 1.8 02/06/2021     Recent Labs     02/05/21  1314 02/06/21  0735   ALKPHOS 73 74   ALT 29 25   AST 25 24   PROT 5.7* 5.8*   BILITOT 0.5 0.8   BILIDIR <0.2  --    LABALBU 3.4* 3.6     Recent Labs     02/05/21  1220 02/06/21  0735   WBC 8.1 9.5   RBC 4.53 4.55   HGB 14.7 14.9   HCT 45.6 45.2   .7* 99.3   MCH 32.5 32.7   MCHC 32.2 33.0   RDW 14.0 13.8    199   MPV 11.2 11.4     Lab Results   Component Value Date    TROPONINI <0.01 02/05/2021     No results found for: INR, PROTIME  Lab Results   Component Value Date    TSH 1.760 02/06/2021     Lab Results   Component Value Date    LABA1C 5.7 (H) 02/06/2021     No results found for: EAG  Lab Results   Component Value Date    CHOL 160 02/06/2021    CHOL 208 (H) 11/21/2014    CHOL 233 (H) 11/21/2013     Lab Results   Component

## 2021-02-07 LAB
FOLATE: 10.3 NG/ML (ref 4.8–24.2)
LV EF: 18 %
LVEF MODALITY: NORMAL
VITAMIN B-12: 338 PG/ML (ref 211–946)

## 2021-02-07 PROCEDURE — 6370000000 HC RX 637 (ALT 250 FOR IP): Performed by: INTERNAL MEDICINE

## 2021-02-07 PROCEDURE — 1200000000 HC SEMI PRIVATE

## 2021-02-07 PROCEDURE — 2700000000 HC OXYGEN THERAPY PER DAY

## 2021-02-07 PROCEDURE — 99233 SBSQ HOSP IP/OBS HIGH 50: CPT | Performed by: INTERNAL MEDICINE

## 2021-02-07 PROCEDURE — 2580000003 HC RX 258: Performed by: INTERNAL MEDICINE

## 2021-02-07 PROCEDURE — 6360000002 HC RX W HCPCS: Performed by: INTERNAL MEDICINE

## 2021-02-07 PROCEDURE — 93306 TTE W/DOPPLER COMPLETE: CPT

## 2021-02-07 PROCEDURE — 94640 AIRWAY INHALATION TREATMENT: CPT

## 2021-02-07 RX ORDER — ASPIRIN 81 MG/1
81 TABLET, CHEWABLE ORAL DAILY
Status: DISCONTINUED | OUTPATIENT
Start: 2021-02-07 | End: 2021-02-09 | Stop reason: HOSPADM

## 2021-02-07 RX ORDER — FUROSEMIDE 10 MG/ML
20 INJECTION INTRAMUSCULAR; INTRAVENOUS 2 TIMES DAILY
Status: DISCONTINUED | OUTPATIENT
Start: 2021-02-07 | End: 2021-02-08

## 2021-02-07 RX ORDER — MAGNESIUM OXIDE 400 MG/1
400 TABLET ORAL 2 TIMES DAILY
Status: DISCONTINUED | OUTPATIENT
Start: 2021-02-07 | End: 2021-02-09 | Stop reason: HOSPADM

## 2021-02-07 RX ADMIN — METOPROLOL SUCCINATE 25 MG: 25 TABLET, EXTENDED RELEASE ORAL at 10:05

## 2021-02-07 RX ADMIN — IPRATROPIUM BROMIDE AND ALBUTEROL SULFATE 1 AMPULE: .5; 3 SOLUTION RESPIRATORY (INHALATION) at 23:15

## 2021-02-07 RX ADMIN — SACUBITRIL AND VALSARTAN 1 TABLET: 24; 26 TABLET, FILM COATED ORAL at 10:06

## 2021-02-07 RX ADMIN — IPRATROPIUM BROMIDE AND ALBUTEROL SULFATE 1 AMPULE: .5; 3 SOLUTION RESPIRATORY (INHALATION) at 13:58

## 2021-02-07 RX ADMIN — ENOXAPARIN SODIUM 30 MG: 30 INJECTION SUBCUTANEOUS at 17:31

## 2021-02-07 RX ADMIN — PANTOPRAZOLE SODIUM 40 MG: 40 TABLET, DELAYED RELEASE ORAL at 06:32

## 2021-02-07 RX ADMIN — SODIUM CHLORIDE, PRESERVATIVE FREE 10 ML: 5 INJECTION INTRAVENOUS at 10:06

## 2021-02-07 RX ADMIN — MAGNESIUM OXIDE 400 MG: 400 TABLET ORAL at 20:19

## 2021-02-07 RX ADMIN — LORAZEPAM 0.5 MG: 0.5 TABLET ORAL at 17:34

## 2021-02-07 RX ADMIN — COLCHICINE 0.6 MG: 0.6 TABLET, FILM COATED ORAL at 20:17

## 2021-02-07 RX ADMIN — IPRATROPIUM BROMIDE AND ALBUTEROL SULFATE 1 AMPULE: .5; 3 SOLUTION RESPIRATORY (INHALATION) at 18:12

## 2021-02-07 RX ADMIN — ASPIRIN 81 MG: 81 TABLET, CHEWABLE ORAL at 17:30

## 2021-02-07 RX ADMIN — THIAMINE HCL TAB 100 MG 100 MG: 100 TAB at 10:06

## 2021-02-07 RX ADMIN — COLCHICINE 0.6 MG: 0.6 TABLET, FILM COATED ORAL at 10:06

## 2021-02-07 RX ADMIN — SACUBITRIL AND VALSARTAN 1 TABLET: 24; 26 TABLET, FILM COATED ORAL at 20:17

## 2021-02-07 RX ADMIN — IPRATROPIUM BROMIDE AND ALBUTEROL SULFATE 1 AMPULE: .5; 3 SOLUTION RESPIRATORY (INHALATION) at 06:16

## 2021-02-07 RX ADMIN — SODIUM CHLORIDE, PRESERVATIVE FREE 10 ML: 5 INJECTION INTRAVENOUS at 20:18

## 2021-02-07 RX ADMIN — ALLOPURINOL 100 MG: 100 TABLET ORAL at 10:06

## 2021-02-07 RX ADMIN — FUROSEMIDE 20 MG: 10 INJECTION, SOLUTION INTRAMUSCULAR; INTRAVENOUS at 10:05

## 2021-02-07 RX ADMIN — FUROSEMIDE 20 MG: 10 INJECTION, SOLUTION INTRAMUSCULAR; INTRAVENOUS at 17:31

## 2021-02-07 RX ADMIN — IPRATROPIUM BROMIDE AND ALBUTEROL SULFATE 1 AMPULE: .5; 3 SOLUTION RESPIRATORY (INHALATION) at 10:30

## 2021-02-07 ASSESSMENT — PAIN DESCRIPTION - FREQUENCY: FREQUENCY: CONTINUOUS

## 2021-02-07 ASSESSMENT — PAIN SCALES - GENERAL
PAINLEVEL_OUTOF10: 7
PAINLEVEL_OUTOF10: 0

## 2021-02-07 ASSESSMENT — PAIN DESCRIPTION - PAIN TYPE: TYPE: CHRONIC PAIN

## 2021-02-07 NOTE — PROGRESS NOTES
[address]    Name: Amalia Sanchez    Age: 68 y.o. Date of Service: 2/7/2021    Reason for Consultation: Dilated cardiomyopathy, acute systolic CHF    Referring Physician: Rema Mathur DO    History of Present Illness:  Amalia Sanchez is a 68 y.o. male (new to [facility]) Cardiology -- Dr. Sherri Morgan) who presented to [facility] on 2/5/2021 for further evaluation of progressive SOB for the past 1 month. His PMH is outlined in detail below (see A/P below). He is a sub-optimal historian. He denies recent chest pain, palpitations, syncope. +orthopnea prior to admission. Respiratory status improved s/p thoracentesis on admission, but still with AREVALO. He is currently with no acute distress at rest. No new overnight cardiac complaints. SR on EKG and telemetry. He is a dentist.    Review of Systems:   Cardiac: As per HPI  General: No fever, chills  Pulmonary: As per HPI  HEENT: No visual disturbances, difficult swallowing  GI: No nausea, vomiting  : No dysuria, hematuria  Endocrine: No thyroid disease or DM  Musculoskeletal: HALE x 4, no focal motor deficits  Skin: Intact, no rashes  Neuro: No headache, seizures  Psych: Currently with no depression, anxiety    Past Medical History:  Past Medical History:   Diagnosis Date    Gout     Torn rotator cuff 2008?     left       Past Surgical History:  Past Surgical History:   Procedure Laterality Date    CATARACT REMOVAL WITH IMPLANT Right 11/02/2016    COLONOSCOPY  2001    neg    FRACTURE SURGERY Left 1958    thumb    TONSILLECTOMY      UPPER GASTROINTESTINAL ENDOSCOPY  2001    small hiatal hernia    UPPER GASTROINTESTINAL ENDOSCOPY N/A 4/17/2019    EGD DILATION SAVORY performed by Markus Murphy MD at [address] [facility] [facility]  4/17/2019    EGD BIOPSY performed by Markus Murphy MD at [address] [facility]  4/17/2019    EGD POLYP SNARE performed by Markus Murphy MD at [place] [address] Family History:  Non-contributory    Social History:  Social History     Socioeconomic History    Marital status:      Spouse name: Not on file    Number of children: Not on file    Years of education: Not on file    Highest education level: Not on file   Occupational History    Not on file   Social Needs    Financial resource strain: Not on file    Food insecurity     Worry: Not on file     Inability: Not on file    Transportation needs     Medical: Not on file     Non-medical: Not on file   Tobacco Use    Smoking status: Never Smoker    Smokeless tobacco: Never Used    Tobacco comment: smoked cigars 20 years ago on occasion   Substance and Sexual Activity    Alcohol use: Yes     Alcohol/week: 4.0 standard drinks     Types: 4 Glasses of wine per week     Comment: Daily wine    Drug use: No    Sexual activity: Not on file   Lifestyle    Physical activity     Days per week: Not on file     Minutes per session: Not on file    Stress: Not on file   Relationships    Social connections     Talks on phone: Not on file     Gets together: Not on file     Attends Christian service: Not on file     Active member of club or organization: Not on file     Attends meetings of clubs or organizations: Not on file     Relationship status: Not on file    Intimate partner violence     Fear of current or ex partner: Not on file     Emotionally abused: Not on file     Physically abused: Not on file     Forced sexual activity: Not on file   Other Topics Concern    Not on file   Social History Narrative    Not on file       Allergies:  No Known Allergies    Home Medications:  Prior to Admission medications    Medication Sig Start Date End Date Taking?  Authorizing Provider   allopurinol (ZYLOPRIM) 100 MG tablet Take 100 mg by mouth daily   Yes Historical Provider, MD       Current Medications:  Current Facility-Administered Medications   Medication Dose Route Frequency Provider Last Rate Last Admin    0.5 mg  0.5 mg Oral Q4H PRN Dorrene Pavan Bismala, DO   0.5 mg at 02/06/21 0335         Physical Exam:  /77   Pulse 86   Temp 98.1 °F (36.7 °C) (Oral)   Resp 20   Ht 5' 6\" (1.676 m)   Wt 154 lb (69.9 kg)   SpO2 95%   BMI 24.86 kg/m²   Wt Readings from Last 3 Encounters:   02/07/21 154 lb (69.9 kg)   04/17/19 164 lb 4.8 oz (74.5 kg)   06/06/17 170 lb (77.1 kg)     Appearance: Awake, no acute respiratory distress  Skin: Intact, no rash  Head: Normocephalic, atraumatic  Eyes: EOMI, no conjunctival erythema  ENMT: No pharyngeal erythema, MMM, no rhinorrhea  Neck: Supple, no carotid bruits  Lungs: Decreased BS B/L, no wheezing  Cardiac: Regular rate and rhythm, no murmurs apparent  Abdomen: Soft, nontender, +bowel sounds  Extremities: Moves all extremities x 4, no lower extremity edema  Neurologic: No focal motor deficits apparent, normal mood and affect    Intake/Output:    Intake/Output Summary (Last 24 hours) at 2/7/2021 1242  Last data filed at 2/7/2021 2753  Gross per 24 hour   Intake 250 ml   Output 0 ml   Net 250 ml     No intake/output data recorded.     Laboratory Tests:  Recent Labs     02/05/21  1220 02/06/21  0735    143   K 5.4* 4.2   * 109*   CO2 23 23   BUN 24* 23   CREATININE 1.2 1.3*   GLUCOSE 114* 100*   CALCIUM 9.2 9.0     Lab Results   Component Value Date    MG 1.8 02/06/2021     Recent Labs     02/05/21  1314 02/06/21  0735   ALKPHOS 73 74   ALT 29 25   AST 25 24   PROT 5.7* 5.8*   BILITOT 0.5 0.8   BILIDIR <0.2  --    LABALBU 3.4* 3.6     Recent Labs     02/05/21  1220 02/06/21  0735   WBC 8.1 9.5   RBC 4.53 4.55   HGB 14.7 14.9   HCT 45.6 45.2   .7* 99.3   MCH 32.5 32.7   MCHC 32.2 33.0   RDW 14.0 13.8    199   MPV 11.2 11.4     Lab Results   Component Value Date    TROPONINI <0.01 02/05/2021     No results found for: INR, PROTIME  Lab Results   Component Value Date    TSH 1.760 02/06/2021     Lab Results   Component Value Date    LABA1C 5.7 (H) 02/06/2021     No results found for: EAG  Lab Results   Component Value Date    CHOL 160 02/06/2021    CHOL 208 (H) 11/21/2014    CHOL 233 (H) 11/21/2013     Lab Results   Component Value Date    TRIG 79 02/06/2021    TRIG 210 (H) 11/21/2014    TRIG 103 11/21/2013     Lab Results   Component Value Date    HDL 36 02/06/2021    HDL 57 11/21/2014    HDL 68 11/21/2013     Lab Results   Component Value Date    LDLCALC 108 (H) 02/06/2021    LDLCALC 109 (H) 11/21/2014    LDLCALC 144 (H) 11/21/2013     Lab Results   Component Value Date    LABVLDL 16 02/06/2021    LABVLDL 42 11/21/2014     No results found for: CHOLHDLRATIO  Recent Labs     02/05/21  1220   PROBNP 18,507*       Cardiac Tests:  EKG reviewed (EKG date: 2/5/2021): SR, rate 78, LAD, PRWP    Telemetry reviewed (date: 2/7/2021): SR, rate 70's-80's    CTA chest: 2/5/2021  No evidence of pulmonary embolism.  Respiratory motion limits evaluation for   small pulmonary emboli. Moderate pulmonary and interstitial edema. Moderate to large bilateral pleural effusions with compressive atelectasis. CXR reviewed: 2/5/2021  Interval reduction in volume of right pleural fluid following thoracentesis. No convincing evidence of a pneumothorax. Persistent moderate size left pleural effusion with left basilar airspace   opacification, likely atelectasis. ASSESSMENT / PLAN:  1. Acute HFrEF  2. Dilated cardiomyopathy  3. Aortic regurgitation  4. CAD / coronary artery calcifications on CT chest  5. Bilateral pleural effusions s/p right sided thoracentesis on 2/5/2021 (1500 cc)  6. History of increased ETOH intake  7. HLD  8. Mild renal insufficiency -- most recent Cr 1.2 --> 1.3  9.  Elevated uric acid level -- on allopurinol    - Echocardiogram currently being performed -- preliminary findings include pleural effusion, dilated LV, severely reduced LV systolic function, moderate aortic regurgitation, and stage 3 DD  - Continue IV lasix  - Toprol XL and entresto started this admission -- up-titrate doses as able  - Check BMP  - Add aldactone as hemodynamics and BMP remain stable  - Ischemic work-up once volume status improves (cardiac catheterization -- this was discussed with the patient today)  - ETOH cessation  - Telemetry reviewed (SR)  - Case discussed with the patient, his significant other, and primary service today    Thank you for allowing me to participate in your patient's care. Please feel free to contact me if you have any questions or concerns.     Rhonda Benavides MD  [facility]) Cardiology

## 2021-02-07 NOTE — PROGRESS NOTES
bruising. Hematologic/Lymphatic:  Denies bruising or bleeding. Physical Exam:  No intake/output data recorded. Intake/Output Summary (Last 24 hours) at 2/7/2021 1403  Last data filed at 2/7/2021 3686  Gross per 24 hour   Intake 250 ml   Output 0 ml   Net 250 ml   I/O last 3 completed shifts: In: 370 [P.O.:360; I.V.:10]  Out: 0   Patient Vitals for the past 96 hrs (Last 3 readings):   Weight   02/07/21 0630 154 lb (69.9 kg)   02/06/21 0559 160 lb 12.8 oz (72.9 kg)   02/05/21 1715 164 lb (74.4 kg)     Vital Signs:   Blood pressure 124/77, pulse 86, temperature 98.1 °F (36.7 °C), temperature source Oral, resp. rate 20, height 5' 6\" (1.676 m), weight 154 lb (69.9 kg), SpO2 95 %. General appearance:  Alert, responsive, oriented to person, place, and time. Well preserved, alert, no distress. Head:  Normocephalic. No masses, lesions or tenderness. Eyes:  PERRLA. EOMI. Sclera clear. Buccal mucosa moist.  ENT:  Ears normal. Mucosa normal.  Off oxygen  Neck:    Supple. Trachea midline. No thyromegaly. No JVD. No bruits. Heart:    Rhythm regular. Rate controlled. No murmurs. Lungs:    Diminished at the base. Abdomen:   Soft. Non-tender. Non-distended. Bowel sounds positive. No organomegaly or masses. No pain on palpation. Extremities:    Peripheral pulses present. No peripheral edema. No ulcers. No cyanosis. No clubbing. Neurologic:    Alert x 3. No focal deficit. Cranial nerves grossly intact. No focal weakness. Psych:   Appears slightly agitated and restless. Musculoskeletal:   Spine ROM normal. Muscular strength intact. Gait not assessed. Integumentary:  No rashes  Skin normal color and texture.   Genitalia/Breast:  Deferred    Medication:  Scheduled Meds:   furosemide  20 mg Intravenous BID    aspirin  81 mg Oral Daily    metoprolol succinate  25 mg Oral Daily    colchicine  0.6 mg Oral BID    sodium chloride  1,000 mL Intravenous Once    sodium chloride flush  10 mL Intravenous 2 times per day    enoxaparin  30 mg Subcutaneous Daily    allopurinol  100 mg Oral Daily    pantoprazole  40 mg Oral QAM AC    sacubitril-valsartan  1 tablet Oral BID    ipratropium-albuterol  1 ampule Inhalation Q4H    thiamine mononitrate  100 mg Oral Daily     Continuous Infusions:    Objective Data:  CBC with Differential:    Lab Results   Component Value Date    WBC 9.5 02/06/2021    RBC 4.55 02/06/2021    HGB 14.9 02/06/2021    HCT 45.2 02/06/2021     02/06/2021    MCV 99.3 02/06/2021    MCH 32.7 02/06/2021    MCHC 33.0 02/06/2021    RDW 13.8 02/06/2021    SEGSPCT 71 11/21/2013    LYMPHOPCT 8.8 02/06/2021    MONOPCT 5.6 02/06/2021    BASOPCT 0.5 02/06/2021    MONOSABS 0.53 02/06/2021    LYMPHSABS 0.84 02/06/2021    EOSABS 0.04 02/06/2021    BASOSABS 0.05 02/06/2021     CMP:    Lab Results   Component Value Date     02/06/2021    K 4.2 02/06/2021     02/06/2021    CO2 23 02/06/2021    BUN 23 02/06/2021    CREATININE 1.3 02/06/2021    GFRAA >60 02/06/2021    LABGLOM 53 02/06/2021    GLUCOSE 100 02/06/2021    GLUCOSE 85 04/01/2011    PROT 5.8 02/06/2021    LABALBU 3.6 02/06/2021    LABALBU 4.1 04/01/2011    CALCIUM 9.0 02/06/2021    BILITOT 0.8 02/06/2021    ALKPHOS 74 02/06/2021    AST 24 02/06/2021    ALT 25 02/06/2021              Assessment:    · Progressive shortness of breath with abnormal CAT scan showing large bilateral pleural effusions with interstitial edema, possibly secondary to cardiomyopathy with echocardiogram showing EF less than 20%  · 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 · Benign prostatic hypertrophy with nocturia x3 or 4--PSA normal.  · Hyperuricemia. · History of gastroesophageal reflux disease. · Cognitive impairment possibly secondary to alcohol use versus other etiology such as acute delirium clinically improved today  · Cardiac dysrhythmia nonsustained V. tach      Plan:     · Echocardiogram shows severe left ventricular impairment. Patient is currently on beta-blocker and has been started on Entresto. Continue diuretic therapy for symptomatic relief. Follow renal function closely  · We will obtain thoracentesis on left side tomorrow  · Cardiology has been consulted I have discussed the condition with them. They plan on catheterization on Tuesday  · Cognitive impairment much improved today. · Patient had episode of ventricular tachycardia last night nonsustained. Will check magnesium level  · Colchicine added for hyperuricemia      More than 50% of my  time was spent at the bedside counseling/coordinating care with the patient and/or family with face to face contact. This time was spent reviewing notes and laboratory data as well as instructing and counseling the patient. Time I spent with the family or surrogate(s) is included only if the patient was incapable of providing the necessary information or participating in medical decisions. I also discussed the differential diagnosis and all of the proposed management plans with the patient and individuals accompanying the patient. Mary Hough requires this high level of physician care and nursing on the Telemetry unit due the complexity of decision management and chance of rapid decline or death. Continued cardiac monitoring and higher level of nursing are required. I am readily available for any further decision-making and intervention.      Manny Vasquez DO, F.A.C.O.I.  2/7/2021  2:03 PM

## 2021-02-07 NOTE — PROGRESS NOTES
CMU called at beginning of this shift around 23:30 stating patient had six beats of V-tach. Charge nurse was notified. Patient was resting in bed. Vitals will be re-assessed again. Patient has denied pain thus far this shift. Will continue to monitor the patient.     Electronically signed by Annmarie Figueroa RN on 2/7/2021 at 3:58 AM

## 2021-02-07 NOTE — PLAN OF CARE
Problem: Falls - Risk of:  Goal: Will remain free from falls  Description: Will remain free from falls  2/7/2021 0525 by Ida Abad RN  Outcome: Met This Shift  2/7/2021 0525 by Ida Abad RN  Outcome: Met This Shift  Goal: Absence of physical injury  Description: Absence of physical injury  2/7/2021 0525 by Ida Abad RN  Outcome: Met This Shift  2/7/2021 0525 by Ida Abad RN  Outcome: Met This Shift

## 2021-02-08 ENCOUNTER — APPOINTMENT (OUTPATIENT)
Dept: INTERVENTIONAL RADIOLOGY/VASCULAR | Age: 81
DRG: 314 | End: 2021-02-08
Payer: MEDICARE

## 2021-02-08 ENCOUNTER — APPOINTMENT (OUTPATIENT)
Dept: GENERAL RADIOLOGY | Age: 81
DRG: 314 | End: 2021-02-08
Payer: MEDICARE

## 2021-02-08 LAB
ADENOVIRUS BY PCR: NOT DETECTED
ALBUMIN SERPL-MCNC: 3.7 G/DL (ref 3.5–5.2)
ALP BLD-CCNC: 70 U/L (ref 40–129)
ALT SERPL-CCNC: 18 U/L (ref 0–40)
ANION GAP SERPL CALCULATED.3IONS-SCNC: 13 MMOL/L (ref 7–16)
AST SERPL-CCNC: 14 U/L (ref 0–39)
BASOPHILS ABSOLUTE: 0.05 E9/L (ref 0–0.2)
BASOPHILS RELATIVE PERCENT: 0.7 % (ref 0–2)
BILIRUB SERPL-MCNC: 0.6 MG/DL (ref 0–1.2)
BORDETELLA PARAPERTUSSIS BY PCR: NOT DETECTED
BORDETELLA PERTUSSIS BY PCR: NOT DETECTED
BUN BLDV-MCNC: 22 MG/DL (ref 8–23)
CALCIUM SERPL-MCNC: 8.7 MG/DL (ref 8.6–10.2)
CHLAMYDOPHILIA PNEUMONIAE BY PCR: NOT DETECTED
CHLORIDE BLD-SCNC: 109 MMOL/L (ref 98–107)
CO2: 25 MMOL/L (ref 22–29)
CORONAVIRUS 229E BY PCR: NOT DETECTED
CORONAVIRUS HKU1 BY PCR: NOT DETECTED
CORONAVIRUS NL63 BY PCR: NOT DETECTED
CORONAVIRUS OC43 BY PCR: NOT DETECTED
CREAT SERPL-MCNC: 1.2 MG/DL (ref 0.7–1.2)
EOSINOPHILS ABSOLUTE: 0.14 E9/L (ref 0.05–0.5)
EOSINOPHILS RELATIVE PERCENT: 2 % (ref 0–6)
GFR AFRICAN AMERICAN: >60
GFR NON-AFRICAN AMERICAN: 59 ML/MIN/1.73
GLUCOSE BLD-MCNC: 104 MG/DL (ref 74–99)
HAV IGM SER IA-ACNC: NORMAL
HCT VFR BLD CALC: 44.3 % (ref 37–54)
HEMOGLOBIN: 14.3 G/DL (ref 12.5–16.5)
HEPATITIS B CORE IGM ANTIBODY: NORMAL
HEPATITIS B SURFACE ANTIGEN INTERPRETATION: NORMAL
HEPATITIS C ANTIBODY INTERPRETATION: NORMAL
HUMAN METAPNEUMOVIRUS BY PCR: NOT DETECTED
HUMAN RHINOVIRUS/ENTEROVIRUS BY PCR: NOT DETECTED
IMMATURE GRANULOCYTES #: 0.02 E9/L
IMMATURE GRANULOCYTES %: 0.3 % (ref 0–5)
INFLUENZA A BY PCR: NOT DETECTED
INFLUENZA B BY PCR: NOT DETECTED
INR BLD: 1.3
LYMPHOCYTES ABSOLUTE: 1.07 E9/L (ref 1.5–4)
LYMPHOCYTES RELATIVE PERCENT: 15 % (ref 20–42)
MAGNESIUM: 1.7 MG/DL (ref 1.6–2.6)
MAGNESIUM: 1.8 MG/DL (ref 1.6–2.6)
MCH RBC QN AUTO: 32 PG (ref 26–35)
MCHC RBC AUTO-ENTMCNC: 32.3 % (ref 32–34.5)
MCV RBC AUTO: 99.1 FL (ref 80–99.9)
MONOCYTES ABSOLUTE: 0.44 E9/L (ref 0.1–0.95)
MONOCYTES RELATIVE PERCENT: 6.2 % (ref 2–12)
MYCOPLASMA PNEUMONIAE BY PCR: NOT DETECTED
NEUTROPHILS ABSOLUTE: 5.41 E9/L (ref 1.8–7.3)
NEUTROPHILS RELATIVE PERCENT: 75.8 % (ref 43–80)
PARAINFLUENZA VIRUS 1 BY PCR: NOT DETECTED
PARAINFLUENZA VIRUS 2 BY PCR: NOT DETECTED
PARAINFLUENZA VIRUS 3 BY PCR: NOT DETECTED
PARAINFLUENZA VIRUS 4 BY PCR: NOT DETECTED
PDW BLD-RTO: 13.6 FL (ref 11.5–15)
PHOSPHORUS: 3.1 MG/DL (ref 2.5–4.5)
PLATELET # BLD: 184 E9/L (ref 130–450)
PMV BLD AUTO: 11.5 FL (ref 7–12)
POTASSIUM SERPL-SCNC: 3.5 MMOL/L (ref 3.5–5)
PROTHROMBIN TIME: 14.4 SEC (ref 9.3–12.4)
RBC # BLD: 4.47 E12/L (ref 3.8–5.8)
RESPIRATORY SYNCYTIAL VIRUS BY PCR: NOT DETECTED
SARS-COV-2, PCR: NOT DETECTED
SODIUM BLD-SCNC: 147 MMOL/L (ref 132–146)
TOTAL PROTEIN: 5.8 G/DL (ref 6.4–8.3)
WBC # BLD: 7.1 E9/L (ref 4.5–11.5)

## 2021-02-08 PROCEDURE — 32555 ASPIRATE PLEURA W/ IMAGING: CPT

## 2021-02-08 PROCEDURE — 1200000000 HC SEMI PRIVATE

## 2021-02-08 PROCEDURE — 94640 AIRWAY INHALATION TREATMENT: CPT

## 2021-02-08 PROCEDURE — 0W9B3ZZ DRAINAGE OF LEFT PLEURAL CAVITY, PERCUTANEOUS APPROACH: ICD-10-PCS | Performed by: RADIOLOGY

## 2021-02-08 PROCEDURE — 99233 SBSQ HOSP IP/OBS HIGH 50: CPT | Performed by: INTERNAL MEDICINE

## 2021-02-08 PROCEDURE — 85610 PROTHROMBIN TIME: CPT

## 2021-02-08 PROCEDURE — 36415 COLL VENOUS BLD VENIPUNCTURE: CPT

## 2021-02-08 PROCEDURE — 6370000000 HC RX 637 (ALT 250 FOR IP): Performed by: INTERNAL MEDICINE

## 2021-02-08 PROCEDURE — C1729 CATH, DRAINAGE: HCPCS

## 2021-02-08 PROCEDURE — 85025 COMPLETE CBC W/AUTO DIFF WBC: CPT

## 2021-02-08 PROCEDURE — 6360000002 HC RX W HCPCS: Performed by: INTERNAL MEDICINE

## 2021-02-08 PROCEDURE — 2580000003 HC RX 258: Performed by: INTERNAL MEDICINE

## 2021-02-08 PROCEDURE — 71045 X-RAY EXAM CHEST 1 VIEW: CPT

## 2021-02-08 PROCEDURE — 84100 ASSAY OF PHOSPHORUS: CPT

## 2021-02-08 PROCEDURE — 0202U NFCT DS 22 TRGT SARS-COV-2: CPT

## 2021-02-08 PROCEDURE — 83735 ASSAY OF MAGNESIUM: CPT

## 2021-02-08 PROCEDURE — 80053 COMPREHEN METABOLIC PANEL: CPT

## 2021-02-08 RX ORDER — FOLIC ACID 1 MG/1
1 TABLET ORAL DAILY
Status: DISCONTINUED | OUTPATIENT
Start: 2021-02-08 | End: 2021-02-09 | Stop reason: HOSPADM

## 2021-02-08 RX ORDER — FUROSEMIDE 10 MG/ML
20 INJECTION INTRAMUSCULAR; INTRAVENOUS 2 TIMES DAILY
Status: DISCONTINUED | OUTPATIENT
Start: 2021-02-09 | End: 2021-02-09 | Stop reason: HOSPADM

## 2021-02-08 RX ADMIN — PANTOPRAZOLE SODIUM 40 MG: 40 TABLET, DELAYED RELEASE ORAL at 06:33

## 2021-02-08 RX ADMIN — METOPROLOL SUCCINATE 25 MG: 25 TABLET, EXTENDED RELEASE ORAL at 09:12

## 2021-02-08 RX ADMIN — SODIUM CHLORIDE, PRESERVATIVE FREE 10 ML: 5 INJECTION INTRAVENOUS at 20:56

## 2021-02-08 RX ADMIN — IPRATROPIUM BROMIDE AND ALBUTEROL SULFATE 1 AMPULE: .5; 3 SOLUTION RESPIRATORY (INHALATION) at 06:20

## 2021-02-08 RX ADMIN — ENOXAPARIN SODIUM 30 MG: 30 INJECTION SUBCUTANEOUS at 18:18

## 2021-02-08 RX ADMIN — SACUBITRIL AND VALSARTAN 1 TABLET: 24; 26 TABLET, FILM COATED ORAL at 09:12

## 2021-02-08 RX ADMIN — MAGNESIUM OXIDE 400 MG: 400 TABLET ORAL at 20:55

## 2021-02-08 RX ADMIN — IPRATROPIUM BROMIDE AND ALBUTEROL SULFATE 1 AMPULE: .5; 3 SOLUTION RESPIRATORY (INHALATION) at 13:06

## 2021-02-08 RX ADMIN — FOLIC ACID 1 MG: 1 TABLET ORAL at 13:14

## 2021-02-08 RX ADMIN — ALLOPURINOL 100 MG: 100 TABLET ORAL at 09:12

## 2021-02-08 RX ADMIN — COLCHICINE 0.6 MG: 0.6 TABLET, FILM COATED ORAL at 20:55

## 2021-02-08 RX ADMIN — SACUBITRIL AND VALSARTAN 1 TABLET: 24; 26 TABLET, FILM COATED ORAL at 20:55

## 2021-02-08 RX ADMIN — MAGNESIUM OXIDE 400 MG: 400 TABLET ORAL at 09:12

## 2021-02-08 RX ADMIN — SODIUM CHLORIDE, PRESERVATIVE FREE 10 ML: 5 INJECTION INTRAVENOUS at 09:15

## 2021-02-08 RX ADMIN — IPRATROPIUM BROMIDE AND ALBUTEROL SULFATE 1 AMPULE: .5; 3 SOLUTION RESPIRATORY (INHALATION) at 09:31

## 2021-02-08 RX ADMIN — THIAMINE HCL TAB 100 MG 100 MG: 100 TAB at 09:12

## 2021-02-08 RX ADMIN — COLCHICINE 0.6 MG: 0.6 TABLET, FILM COATED ORAL at 09:12

## 2021-02-08 RX ADMIN — IPRATROPIUM BROMIDE AND ALBUTEROL SULFATE 1 AMPULE: .5; 3 SOLUTION RESPIRATORY (INHALATION) at 18:27

## 2021-02-08 ASSESSMENT — PAIN SCALES - GENERAL: PAINLEVEL_OUTOF10: 0

## 2021-02-08 NOTE — PROGRESS NOTES
Internal Medicine Progress Note    [facility]    Ellyn Cowden. Gladys Mcmillan., F.A.CMarelyOMarelyI. Patty Garzon D.O., MYKELOANDREA Jackson D.O. Aba Morales, MSN, APRN, NP-C  Belkys Richardson. Gigi Rodriguez, MSN, APRN-CNP     Primary Care Physician: Josué Lopez DO   Admitting Physician:  Cheryl Maxwell DO  Admission date and time: 2/5/2021 11:42 AM    Room:  [address] [place], [place] [address]  Admitting diagnosis: Pleural effusion [J90]    Patient Name: Lisbet Hernandez  MRN: [id number]    Date of Service: 2/8/2021     Subjective:  Jarrod Aguirre is a 68 y.o. male who was seen and examined today,2/8/2021, at the bedside. Jarrod Aguirre is resting during the exam and Gunner malik, present during my examination. We have discussed plan for today of thoracentesis for symptomatic relief and voices understanding and agreement. Otherwise, he understands that he will tentatively be for heart catheterization tomorrow for ischemic evaluation given the degree of cardiomyopathy. He has no acute symptoms presently aside from some cough and mild shortness of breath. Review of System:   Constitutional:   Denies fever or chills, weight loss or gain, admits to malaise and fatigue. HEENT:   Denies ear pain, sore throat, sinus or eye problems. Cardiovascular:   Denies any chest pain, irregular heartbeats, or palpitations. Respiratory:   Admits to shortness of breath with cough. Denies hemoptysis or phlegm. Admits to exertional dyspnea. Gastrointestinal:   Denies nausea, vomiting, diarrhea, or constipation. Denies any abdominal pain. Genitourinary:    Admit to frequent urination  Extremities:   Denies lower extremity swelling, edema or cyanosis. Neurology:    Denies any headache or focal neurological deficits, admits to generalized weakness and appear to be has some cognitive delay   Psch: Admits to anxiety related to his condition. Musculoskeletal:    Denies  myalgias, joint complaints or back pain.    Integumentary: Denies any rashes, ulcers, or excoriations. Denies bruising. Hematologic/Lymphatic:  Denies bruising or bleeding. Physical Exam:  No intake/output data recorded. Intake/Output Summary (Last 24 hours) at 2/8/2021 0905  Last data filed at 2/7/2021 2225  Gross per 24 hour   Intake 595 ml   Output 200 ml   Net 395 ml   I/O last 3 completed shifts: In: 835 [P.O.:835]  Out: 200 [Urine:200]  Patient Vitals for the past 96 hrs (Last 3 readings):   Weight   02/08/21 0600 156 lb (70.8 kg)   02/07/21 0630 154 lb (69.9 kg)   02/06/21 0559 160 lb 12.8 oz (72.9 kg)     Vital Signs:   Blood pressure 109/66, pulse 79, temperature 98.3 °F (36.8 °C), temperature source Oral, resp. rate 20, height 5' 6\" (1.676 m), weight 156 lb (70.8 kg), SpO2 96 %. General appearance:  Alert, responsive, oriented to person, place, and time. Ill-appearing but without distress  Head:  Normocephalic. No masses, lesions or tenderness. Eyes:  PERRLA. EOMI. Sclera clear. Buccal mucosa moist.  ENT:  Ears normal. Mucosa normal.  Off oxygen  Neck:    Supple. Trachea midline. No thyromegaly. No JVD. No bruits. Heart:    Rhythm regular. Rate controlled. Systolic murmur. Lungs: The lungs are diminished bilaterally, the right at the base with some fine rales. The left is diminished up to the mid range with some rales noted in the first audible portion of the lower lung fields. Patient appears to be mildly short of breath at rest.  No labor or accessory muscle usage appreciated. Abdomen:   Soft. Non-tender. Non-distended. Bowel sounds positive. No organomegaly or masses. No pain on palpation. Extremities:    Peripheral pulses present. No peripheral edema. No ulcers. No cyanosis. No clubbing. Neurologic:    Alert x 3. Generally weak without focal deficit. Cranial nerves grossly intact. No focal weakness. Psych:   Appears slightly agitated and restless. Musculoskeletal:   No unilateral joint enlargement, edema or tenderness. Gait not assessed. Integumentary:  No rashes  Skin normal color and texture.   Genitalia/Breast:  Deferred    Medication:  Scheduled Meds:   furosemide  20 mg Intravenous BID    aspirin  81 mg Oral Daily    magnesium oxide  400 mg Oral BID    metoprolol succinate  25 mg Oral Daily    colchicine  0.6 mg Oral BID    sodium chloride  1,000 mL Intravenous Once    sodium chloride flush  10 mL Intravenous 2 times per day    enoxaparin  30 mg Subcutaneous Daily    allopurinol  100 mg Oral Daily    pantoprazole  40 mg Oral QAM AC    sacubitril-valsartan  1 tablet Oral BID    ipratropium-albuterol  1 ampule Inhalation Q4H    thiamine mononitrate  100 mg Oral Daily     Continuous Infusions:    Objective Data:  CBC with Differential:    Lab Results   Component Value Date    WBC 7.1 02/08/2021    RBC 4.47 02/08/2021    HGB 14.3 02/08/2021    HCT 44.3 02/08/2021     02/08/2021    MCV 99.1 02/08/2021    MCH 32.0 02/08/2021    MCHC 32.3 02/08/2021    RDW 13.6 02/08/2021    SEGSPCT 71 11/21/2013    LYMPHOPCT 15.0 02/08/2021    MONOPCT 6.2 02/08/2021    BASOPCT 0.7 02/08/2021    MONOSABS 0.44 02/08/2021    LYMPHSABS 1.07 02/08/2021    EOSABS 0.14 02/08/2021    BASOSABS 0.05 02/08/2021     CMP:    Lab Results   Component Value Date     02/08/2021    K 3.5 02/08/2021     02/08/2021    CO2 25 02/08/2021    BUN 22 02/08/2021    CREATININE 1.2 02/08/2021    GFRAA >60 02/08/2021    LABGLOM 59 02/08/2021    GLUCOSE 104 02/08/2021    GLUCOSE 85 04/01/2011    PROT 5.8 02/08/2021    LABALBU 3.7 02/08/2021    LABALBU 4.1 04/01/2011    CALCIUM 8.7 02/08/2021    BILITOT 0.6 02/08/2021    ALKPHOS 70 02/08/2021    AST 14 02/08/2021    ALT 18 02/08/2021              Assessment:    · Progressive shortness of breath with abnormal CAT scan showing large bilateral pleural effusions with interstitial edema, possibly secondary to cardiomyopathy with echocardiogram showing EF less than 20%  · 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 · Benign prostatic hypertrophy with nocturia x3 or 4--PSA normal.  · Hyperuricemia. · History of gastroesophageal reflux disease. · Cognitive impairment possibly secondary to alcohol use versus other etiology such as acute delirium clinically improved today  · Cardiac dysrhythmia nonsustained V. tach      Plan:   Oziel Durán is for thoracentesis of the left pleural effusion today. He had previously undergone pleural effusion drainage on the right side on February 5. In the setting of severe cardiomyopathy, and the abrupt onset of symptoms as she is very active and played [employer] as recently as last week, ischemic work-up is being undertaken by the cardiology team and medications are being maximized. Intake and output records are inaccurate as the patient has been voiding and apparently not been tracking. Sodium has trended upward. He does not overtly look edematous and much of his respiratory symptoms are related to pleural effusion. Diuretic will be held today and we will monitor moving forward. Tentative plan is for heart catheterization tomorrow. Electrolytes are being assessed and monitored closely as transient arrhythmia has been appreciated during hospitalization. Cardiology is following as well. Again, anticipate transitioning to [address] [place] [address] for heart catheterization tomorrow. More than 50% of my  time was spent at the bedside counseling/coordinating care with the patient and/or family with face to face contact. This time was spent reviewing notes and laboratory data as well as instructing and counseling the patient. Time I spent with the family or surrogate(s) is included only if the patient was incapable of providing the necessary information or participating in medical decisions.  I also discussed the differential diagnosis and all of the proposed management plans with the patient and individuals accompanying the patient. Solitario Clark requires this high level of physician care and nursing on the Telemetry unit due the complexity of decision management and chance of rapid decline or death. Continued cardiac monitoring and higher level of nursing are required. I am readily available for any further decision-making and intervention.      Drake Rich, JESUS - CNP  2/8/2021  9:05 AM

## 2021-02-08 NOTE — CARE COORDINATION
2/8/2021 1050 CM note; NEGATIVE COVID 2/5/21. Met with pt'isabella welch at bedside for transition of care needs. Pt resting quietly. 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 PCP is Dr Jorge Louis, he uses [facility]. He is for thoracentesis today and plans for cardiac catheterization at [facility] tomorrow. Free 30 day trial card for entresto explained and given to pt;isabella welch. Instructed to take this card with  prescription to retail pharmacy to obtain 30 day supply for free. Thor painting notified of life vest order. Federico Vizcarra will obtain prior auth and deliver vest to [facility] post cath.  Dante SHAFFER

## 2021-02-08 NOTE — PROGRESS NOTES
[facility]        CARDIOLOGY                 INPATIENT PROGRESS NOTE          PATIENT SEEN IN FOLLOW UP FOR: CHF, [address] [facility] Day: 4     Lisbet Hernandez is a 68year old patient previously not known to [id number] [facility] Cardiology -Seen by Dr Pink Ok this admission. SUBJECTIVE: Denies any chest pain, breathing better, No PND or orthopnea, No palpitations, Occ cough    ROS: Review of rest of 10 systems negative except as mentioned above    OBJECTIVE: No acute distress. See Assessment     Diagnostics:       Telemetry: Reviewed    2D Echo 2/5/21     Moderately dilated left ventricle. Mild left ventricular concentric hypertrophy noted. Ejection fraction is visually estimated at 15-20%. Severely depressed ejection fraction. The left atrium is mild-moderately dilated. Interatrial septum appears intact. Moderately dilated right ventricle. Decreased RV function   Mildly enlarged right atrium size. Mild to moderate mitral regurgitation is present. No evidence of mitral valve stenosis. Mild tricuspid regurgitation. RVSP is 46.62 mmHg. Pulmonary hypertension is mild . Regular rhythm.    Dilated cardiomyopathy        ----------------------------------------------------------------   Electronically signed by Savi Evans DO(Interpreting   physician) on 02/07/2021 04:07 PM   ----------------------------------------------------------------        Intake/Output Summary (Last 24 hours) at 2/8/2021 0903  Last data filed at 2/7/2021 2225  Gross per 24 hour   Intake 595 ml   Output 200 ml   Net 395 ml       Labs:   CBC:   Recent Labs     02/06/21  0735 02/08/21  0741   WBC 9.5 7.1   HGB 14.9 14.3   HCT 45.2 44.3    184     BMP:   Recent Labs     02/06/21  0735 02/08/21  0741    147*   K 4.2 3.5   CO2 23 25   BUN 23 22   CREATININE 1.3* 1.2   LABGLOM 53 59   CALCIUM 9.0 8.7     Mag:   Recent Labs     02/06/21  0735 02/08/21  0741   MG 1.8 1.7     Phos:   Recent Labs base  CARDIOVASCULAR:     Heart Palpation - no palpable thrills   Heart Ausculation - Regular rate and rhythm, 2/6 systolic murmur, No s3 or rub.   trace lower extremity edema, no varicosities. Distal pulses palpable, no clubbing or cyanosis   ABDOMEN: Soft, Bowel sounds present. no abdominal bruit / pulsation  MS: good muscle strength and tone. : Deferred  Rectal Exam: Deferred  SKIN: warm and dry   NEURO / PSYCH: oriented to person, place        ASSESSMENT/PLAN:    1. Acute HFrEF - Better, Change to PO Lasix, monitor daily Wts, I/Os, BP and renal Fn    2. Dilated cardiomyopathy - EF 15-20% - new, Continue BB and Entresto. Add aldactone as BP tolerates. Cardiac cath tomorrow to r/o CAD; risk benefits discussed, agreeable for Cath/PTCA-stent of CABG. Cath tomorrow 1:30pm at [address] Benefits of Live Vest discussed, agreeable for Live Vest upon discharge. 3. Mild VHD - MR, TR    4. Coronary artery calcifications on CT chest - For cath tomorrow    5. Bilateral pleural effusions s/p right sided thoracentesis on 2/5/2021 (1500 cc) for left Thoracentesis today    6. History of increased ETOH use - Will be counseled to quit drinking  7.   8. Mild renal insufficiency -- Better, Cr 1.2 today    9. Elevated uric acid level -- on Allopurinol               Above recommendations discussed with him and his Ifrah harvey. He would to f/u at [facility] Cardiology office after discharge.     Electronically signed by Josiah Garcia MD on 2/8/2021 at 9:03 AM  [facility]) Cardiology

## 2021-02-09 ENCOUNTER — HOSPITAL ENCOUNTER (INPATIENT)
Dept: CARDIAC CATH/INVASIVE PROCEDURES | Age: 81
LOS: 2 days | Discharge: HOME | DRG: 286 | End: 2021-02-11
Attending: INTERNAL MEDICINE | Admitting: INTERNAL MEDICINE
Payer: MEDICARE

## 2021-02-09 VITALS
OXYGEN SATURATION: 93 % | DIASTOLIC BLOOD PRESSURE: 74 MMHG | BODY MASS INDEX: 25.07 KG/M2 | HEIGHT: 66 IN | TEMPERATURE: 98.3 F | WEIGHT: 156 LBS | RESPIRATION RATE: 18 BRPM | HEART RATE: 78 BPM | SYSTOLIC BLOOD PRESSURE: 132 MMHG

## 2021-02-09 DIAGNOSIS — I50.9 HEART FAILURE OF UNKNOWN TYPE (HCC): ICD-10-CM

## 2021-02-09 PROBLEM — I25.10 CAD IN NATIVE ARTERY: Status: ACTIVE | Noted: 2021-02-09

## 2021-02-09 PROBLEM — I50.23 ACUTE ON CHRONIC SYSTOLIC (CONGESTIVE) HEART FAILURE (HCC): Status: ACTIVE | Noted: 2021-02-09

## 2021-02-09 LAB
ABO/RH: NORMAL
ANION GAP SERPL CALCULATED.3IONS-SCNC: 10 MMOL/L (ref 7–16)
ANTIBODY SCREEN: NORMAL
BASOPHILS ABSOLUTE: 0.05 E9/L (ref 0–0.2)
BASOPHILS RELATIVE PERCENT: 0.6 % (ref 0–2)
BUN BLDV-MCNC: 22 MG/DL (ref 8–23)
CALCIUM SERPL-MCNC: 8.6 MG/DL (ref 8.6–10.2)
CHLORIDE BLD-SCNC: 109 MMOL/L (ref 98–107)
CO2: 24 MMOL/L (ref 22–29)
CREAT SERPL-MCNC: 1.2 MG/DL (ref 0.7–1.2)
EOSINOPHILS ABSOLUTE: 0.12 E9/L (ref 0.05–0.5)
EOSINOPHILS RELATIVE PERCENT: 1.5 % (ref 0–6)
GFR AFRICAN AMERICAN: >60
GFR NON-AFRICAN AMERICAN: 59 ML/MIN/1.73
GLUCOSE BLD-MCNC: 103 MG/DL (ref 74–99)
HCT VFR BLD CALC: 43.9 % (ref 37–54)
HEMOGLOBIN: 14.4 G/DL (ref 12.5–16.5)
HIV-1 AND HIV-2 ANTIBODIES: REACTIVE
IMMATURE GRANULOCYTES #: 0.02 E9/L
IMMATURE GRANULOCYTES %: 0.2 % (ref 0–5)
LYMPHOCYTES ABSOLUTE: 1.06 E9/L (ref 1.5–4)
LYMPHOCYTES RELATIVE PERCENT: 12.9 % (ref 20–42)
MAGNESIUM: 1.9 MG/DL (ref 1.6–2.6)
MCH RBC QN AUTO: 32.7 PG (ref 26–35)
MCHC RBC AUTO-ENTMCNC: 32.8 % (ref 32–34.5)
MCV RBC AUTO: 99.8 FL (ref 80–99.9)
MONOCYTES ABSOLUTE: 0.46 E9/L (ref 0.1–0.95)
MONOCYTES RELATIVE PERCENT: 5.6 % (ref 2–12)
NEUTROPHILS ABSOLUTE: 6.48 E9/L (ref 1.8–7.3)
NEUTROPHILS RELATIVE PERCENT: 79.2 % (ref 43–80)
PDW BLD-RTO: 13.7 FL (ref 11.5–15)
PHOSPHORUS: 3 MG/DL (ref 2.5–4.5)
PLATELET # BLD: 173 E9/L (ref 130–450)
PMV BLD AUTO: 12.3 FL (ref 7–12)
POTASSIUM SERPL-SCNC: 3.9 MMOL/L (ref 3.5–5)
RBC # BLD: 4.4 E12/L (ref 3.8–5.8)
SODIUM BLD-SCNC: 143 MMOL/L (ref 132–146)
WBC # BLD: 8.2 E9/L (ref 4.5–11.5)

## 2021-02-09 PROCEDURE — 94640 AIRWAY INHALATION TREATMENT: CPT

## 2021-02-09 PROCEDURE — 93458 L HRT ARTERY/VENTRICLE ANGIO: CPT

## 2021-02-09 PROCEDURE — 2700000000 HC OXYGEN THERAPY PER DAY

## 2021-02-09 PROCEDURE — G0378 HOSPITAL OBSERVATION PER HR: HCPCS

## 2021-02-09 PROCEDURE — 2580000003 HC RX 258: Performed by: INTERNAL MEDICINE

## 2021-02-09 PROCEDURE — 6370000000 HC RX 637 (ALT 250 FOR IP): Performed by: INTERNAL MEDICINE

## 2021-02-09 PROCEDURE — 36415 COLL VENOUS BLD VENIPUNCTURE: CPT

## 2021-02-09 PROCEDURE — 6360000002 HC RX W HCPCS: Performed by: NURSE PRACTITIONER

## 2021-02-09 PROCEDURE — 85025 COMPLETE CBC W/AUTO DIFF WBC: CPT

## 2021-02-09 PROCEDURE — G0379 DIRECT REFER HOSPITAL OBSERV: HCPCS

## 2021-02-09 PROCEDURE — 83735 ASSAY OF MAGNESIUM: CPT

## 2021-02-09 PROCEDURE — 6360000002 HC RX W HCPCS

## 2021-02-09 PROCEDURE — C1894 INTRO/SHEATH, NON-LASER: HCPCS

## 2021-02-09 PROCEDURE — B2111ZZ FLUOROSCOPY OF MULTIPLE CORONARY ARTERIES USING LOW OSMOLAR CONTRAST: ICD-10-PCS | Performed by: INTERNAL MEDICINE

## 2021-02-09 PROCEDURE — C1887 CATHETER, GUIDING: HCPCS

## 2021-02-09 PROCEDURE — 86850 RBC ANTIBODY SCREEN: CPT

## 2021-02-09 PROCEDURE — 80048 BASIC METABOLIC PNL TOTAL CA: CPT

## 2021-02-09 PROCEDURE — 2709999900 HC NON-CHARGEABLE SUPPLY

## 2021-02-09 PROCEDURE — 86900 BLOOD TYPING SEROLOGIC ABO: CPT

## 2021-02-09 PROCEDURE — 2140000000 HC CCU INTERMEDIATE R&B

## 2021-02-09 PROCEDURE — C1769 GUIDE WIRE: HCPCS

## 2021-02-09 PROCEDURE — 4A023N7 MEASUREMENT OF CARDIAC SAMPLING AND PRESSURE, LEFT HEART, PERCUTANEOUS APPROACH: ICD-10-PCS | Performed by: INTERNAL MEDICINE

## 2021-02-09 PROCEDURE — 84100 ASSAY OF PHOSPHORUS: CPT

## 2021-02-09 PROCEDURE — 93458 L HRT ARTERY/VENTRICLE ANGIO: CPT | Performed by: INTERNAL MEDICINE

## 2021-02-09 PROCEDURE — 86901 BLOOD TYPING SEROLOGIC RH(D): CPT

## 2021-02-09 PROCEDURE — 2500000003 HC RX 250 WO HCPCS

## 2021-02-09 RX ORDER — ACETAMINOPHEN 325 MG/1
650 TABLET ORAL EVERY 4 HOURS PRN
Status: DISCONTINUED | OUTPATIENT
Start: 2021-02-09 | End: 2021-02-11 | Stop reason: HOSPADM

## 2021-02-09 RX ORDER — SODIUM CHLORIDE 0.9 % (FLUSH) 0.9 %
10 SYRINGE (ML) INJECTION EVERY 12 HOURS SCHEDULED
Status: DISCONTINUED | OUTPATIENT
Start: 2021-02-09 | End: 2021-02-11 | Stop reason: HOSPADM

## 2021-02-09 RX ORDER — FUROSEMIDE 20 MG/1
20 TABLET ORAL 2 TIMES DAILY
Status: DISCONTINUED | OUTPATIENT
Start: 2021-02-10 | End: 2021-02-10

## 2021-02-09 RX ORDER — SODIUM CHLORIDE 9 MG/ML
INJECTION, SOLUTION INTRAVENOUS ONCE
Status: DISCONTINUED | OUTPATIENT
Start: 2021-02-09 | End: 2021-02-11 | Stop reason: HOSPADM

## 2021-02-09 RX ORDER — COLCHICINE 0.6 MG/1
0.6 TABLET ORAL 2 TIMES DAILY
Status: DISCONTINUED | OUTPATIENT
Start: 2021-02-09 | End: 2021-02-11 | Stop reason: HOSPADM

## 2021-02-09 RX ORDER — LORAZEPAM 0.5 MG/1
0.5 TABLET ORAL EVERY 4 HOURS PRN
Qty: 90 TABLET | Refills: 0 | Status: ON HOLD
Start: 2021-02-09 | End: 2021-02-11 | Stop reason: HOSPADM

## 2021-02-09 RX ORDER — SODIUM CHLORIDE 0.9 % (FLUSH) 0.9 %
10 SYRINGE (ML) INJECTION PRN
Status: DISCONTINUED | OUTPATIENT
Start: 2021-02-09 | End: 2021-02-11 | Stop reason: HOSPADM

## 2021-02-09 RX ORDER — FUROSEMIDE 10 MG/ML
20 INJECTION INTRAMUSCULAR; INTRAVENOUS 2 TIMES DAILY
Qty: 60 SYRINGE | Refills: 0 | Status: ON HOLD
Start: 2021-02-09 | End: 2021-02-11 | Stop reason: HOSPADM

## 2021-02-09 RX ORDER — ASPIRIN 81 MG/1
81 TABLET, CHEWABLE ORAL DAILY
Qty: 30 TABLET | Refills: 3
Start: 2021-02-10 | End: 2021-05-10

## 2021-02-09 RX ORDER — ALLOPURINOL 100 MG/1
100 TABLET ORAL DAILY
Status: DISCONTINUED | OUTPATIENT
Start: 2021-02-10 | End: 2021-02-11 | Stop reason: HOSPADM

## 2021-02-09 RX ORDER — GAUZE BANDAGE 2" X 2"
100 BANDAGE TOPICAL DAILY
Qty: 30 TABLET | Refills: 0 | Status: ON HOLD
Start: 2021-02-10 | End: 2021-02-11 | Stop reason: SDUPTHER

## 2021-02-09 RX ORDER — METOPROLOL SUCCINATE 25 MG/1
25 TABLET, EXTENDED RELEASE ORAL DAILY
Qty: 30 TABLET | Refills: 0 | Status: ON HOLD
Start: 2021-02-10 | End: 2021-02-11 | Stop reason: SDUPTHER

## 2021-02-09 RX ORDER — COLCHICINE 0.6 MG/1
0.6 TABLET ORAL 2 TIMES DAILY
Qty: 30 TABLET | Refills: 0
Start: 2021-02-09 | End: 2021-03-04

## 2021-02-09 RX ORDER — MAGNESIUM OXIDE 400 MG/1
400 TABLET ORAL 2 TIMES DAILY
Qty: 30 TABLET | Refills: 1 | Status: ON HOLD
Start: 2021-02-09 | End: 2021-02-11 | Stop reason: SDUPTHER

## 2021-02-09 RX ORDER — IPRATROPIUM BROMIDE AND ALBUTEROL SULFATE 2.5; .5 MG/3ML; MG/3ML
3 SOLUTION RESPIRATORY (INHALATION) EVERY 4 HOURS
Qty: 360 ML | Refills: 0 | Status: ON HOLD
Start: 2021-02-09 | End: 2021-02-11 | Stop reason: HOSPADM

## 2021-02-09 RX ORDER — METOPROLOL SUCCINATE 25 MG/1
25 TABLET, EXTENDED RELEASE ORAL DAILY
Status: DISCONTINUED | OUTPATIENT
Start: 2021-02-10 | End: 2021-02-11 | Stop reason: HOSPADM

## 2021-02-09 RX ORDER — IPRATROPIUM BROMIDE AND ALBUTEROL SULFATE 2.5; .5 MG/3ML; MG/3ML
3 SOLUTION RESPIRATORY (INHALATION) EVERY 4 HOURS
Status: DISCONTINUED | OUTPATIENT
Start: 2021-02-09 | End: 2021-02-11 | Stop reason: HOSPADM

## 2021-02-09 RX ORDER — PANTOPRAZOLE SODIUM 40 MG/1
40 TABLET, DELAYED RELEASE ORAL
Status: DISCONTINUED | OUTPATIENT
Start: 2021-02-10 | End: 2021-02-11 | Stop reason: HOSPADM

## 2021-02-09 RX ORDER — POLYETHYLENE GLYCOL 3350 17 G/17G
17 POWDER, FOR SOLUTION ORAL DAILY PRN
Qty: 527 G | Refills: 1 | Status: ON HOLD | COMMUNITY
Start: 2021-02-09 | End: 2021-02-11 | Stop reason: HOSPADM

## 2021-02-09 RX ORDER — FOLIC ACID 1 MG/1
1 TABLET ORAL DAILY
Qty: 30 TABLET | Refills: 0 | Status: ON HOLD
Start: 2021-02-10 | End: 2021-02-11 | Stop reason: HOSPADM

## 2021-02-09 RX ORDER — LORAZEPAM 0.5 MG/1
0.5 TABLET ORAL EVERY 4 HOURS PRN
Status: DISCONTINUED | OUTPATIENT
Start: 2021-02-09 | End: 2021-02-11 | Stop reason: HOSPADM

## 2021-02-09 RX ORDER — ATORVASTATIN CALCIUM 40 MG/1
40 TABLET, FILM COATED ORAL NIGHTLY
Status: DISCONTINUED | OUTPATIENT
Start: 2021-02-09 | End: 2021-02-11 | Stop reason: HOSPADM

## 2021-02-09 RX ORDER — PANTOPRAZOLE SODIUM 40 MG/1
40 TABLET, DELAYED RELEASE ORAL
Qty: 30 TABLET | Refills: 0 | Status: ON HOLD
Start: 2021-02-10 | End: 2021-02-11 | Stop reason: HOSPADM

## 2021-02-09 RX ADMIN — IPRATROPIUM BROMIDE AND ALBUTEROL SULFATE 3 ML: .5; 3 SOLUTION RESPIRATORY (INHALATION) at 21:18

## 2021-02-09 RX ADMIN — SACUBITRIL AND VALSARTAN 1 TABLET: 24; 26 TABLET, FILM COATED ORAL at 08:12

## 2021-02-09 RX ADMIN — ALLOPURINOL 100 MG: 100 TABLET ORAL at 08:12

## 2021-02-09 RX ADMIN — METOPROLOL SUCCINATE 25 MG: 25 TABLET, EXTENDED RELEASE ORAL at 08:12

## 2021-02-09 RX ADMIN — FUROSEMIDE 20 MG: 10 INJECTION, SOLUTION INTRAMUSCULAR; INTRAVENOUS at 08:12

## 2021-02-09 RX ADMIN — IPRATROPIUM BROMIDE AND ALBUTEROL SULFATE 1 AMPULE: .5; 3 SOLUTION RESPIRATORY (INHALATION) at 06:10

## 2021-02-09 RX ADMIN — Medication 10 ML: at 20:52

## 2021-02-09 RX ADMIN — ATORVASTATIN CALCIUM 40 MG: 40 TABLET, FILM COATED ORAL at 20:49

## 2021-02-09 RX ADMIN — SACUBITRIL AND VALSARTAN 1 TABLET: 24; 26 TABLET, FILM COATED ORAL at 20:49

## 2021-02-09 RX ADMIN — COLCHICINE 0.6 MG: 0.6 TABLET ORAL at 20:49

## 2021-02-09 RX ADMIN — SODIUM CHLORIDE, PRESERVATIVE FREE 10 ML: 5 INJECTION INTRAVENOUS at 08:13

## 2021-02-09 RX ADMIN — MAGNESIUM GLUCONATE 500 MG ORAL TABLET 400 MG: 500 TABLET ORAL at 20:49

## 2021-02-09 RX ADMIN — COLCHICINE 0.6 MG: 0.6 TABLET, FILM COATED ORAL at 08:12

## 2021-02-09 ASSESSMENT — PAIN SCALES - GENERAL
PAINLEVEL_OUTOF10: 2
PAINLEVEL_OUTOF10: 0

## 2021-02-09 NOTE — PROCEDURES
[address]                  [address] [facility],  [facility]                            CARDIAC CATHETERIZATION    PATIENT NAME: Sindy Ni                    :        1940  MED REC NO:   [id number]                            ROOM:  ACCOUNT NO:   [de-identified]                           ADMIT DATE: 2021  PROVIDER:     Jack Lazo MD    DATE OF PROCEDURE:  2021    LEFT HEART CATHETERIZATION    INDICATION:  Acute CHF with severe systolic dysfunction with an ejection  fraction of 15% to 20%. REFERRING PROVIDER:  Isael Tipton MD    PROCEDURE NOTE:  The patient was transferred from [facility] to  undergo left heart catheterization. Under sterile condition and under  local anesthetic and using conscious sedation, a 6-Bruneian Terumo slender  sheath was inserted into the right radial artery. The patient received  5000 units of intravenous heparin. He also received 200 mcg of  intraarterial nitroglycerin via the right radial sheath. No verapamil  was given due to severe systolic dysfunction. Then, a 5-Bruneian TIG  diagnostic catheter was advanced to the ascending aorta with difficulty  and using a Wholey wire due to very angulated right subclavian artery. The left main coronary artery was engaged and a coronary angiogram was  done. Then, the right coronary artery was engaged and a coronary  angiogram was done. This catheter was exchanged over a guidewire to a  5-Bruneian pigtail which was advanced into the left ventricle without  difficulty. The left ventricular end-diastolic pressure was measured. No left ventriculogram was done due to known ejection fraction by  echocardiogram.  There was no significant gradient across the aortic  valve. At the end of the procedure, pigtail was pulled out. The Terumo  slender sheath was pulled out and a Vasc Band was applied over the right  radial artery with good hemostasis.   The patient tolerated the procedure  very well and no complications were noted. No significant blood loss  occurred during the procedure. FINDINGS:  HEMODYNAMICS:  Aortic pressure 125/73 mmHg. Left ventricular end-diastolic pressure 31 mmHg. CORONARY ANATOMY:  LEFT MAIN:  It is a short and large artery which was almost absent. LAD:  It is a large artery wrapping around the apex and giving rise to  three diagonal branches and several septal perforators. The first and  third diagonal branches were fair in size and the second diagonal was  small. The LAD was calcified in its proximal to mid segment. There was  40% to 50% discrete mid LAD stenosis. LEFT CIRCUMFLEX:  It is a large codominant artery giving rise to a very  small first obtuse marginal branch which is tortuous. It also gives  rise to a fair size second obtuse marginal branch which is tortuous. It  also gives rise to a very small third obtuse marginal branch, then to a  bifurcating PLV branch. No angiographic stenosis noted in the  circumflex or its branches. RCA:  It is a large codominant artery giving rise to a conus branch,  three right ventricular marginal branches, and a PDA. No angiographic  stenosis noted. IMPRESSION:  1. Moderate single-vessel CAD. 2.  Elevated LVEDP at 31 mmHg. 3.  Nonischemic cardiomyopathy. RECOMMENDATIONS:  1. Aggressive medical therapy. 2.  Guideline-directed medical therapy. PROCEDURE START TIME:  0970. PROCEDURE END TIME:  1634. CONTRAST VOLUME:  29 mL of Optiray. FLUOROSCOPY TIME:  2.3 minutes. CONSCIOUS SEDATION:  1 mg of intravenous Versed and 50 mcg of  intravenous fentanyl.         Jenna Ham MD    D: 02/09/2021 16:53:22       T: 02/09/2021 16:58:10     [id number]  Job#: [id number]     Doc#: [id number]    CC:

## 2021-02-09 NOTE — DISCHARGE SUMMARY
Internal Medicine Progress Note     [facility]     Leatha Parks. Aby Alex., YODIT Canas D.O., CARMEN Tolbert, MSN, APRN, NP-C  [place]. 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 · Benign prostatic hypertrophy with nocturia x3 or 4--PSA normal.  · Hyperuricemia. · History of gastroesophageal reflux disease. · Cognitive impairment possibly secondary to alcohol use versus other etiology such as acute delirium clinically improved today  · Cardiac dysrhythmia nonsustained V. tach    BRIEF HISTORY OF PRESENT ILLNESS:   This is a pleasant  79-year-old white male who was admitted to [address] [place] [address]. The  patient presented to the hospital here through the emergency room on  2021. The patient states that approximately over the past three  weeks, he has been having intermittent shortness of breath and  difficulty breathing. The patient apparently presented to the hospital  here with increasing amount of shortness of breath. Radiographic  findings at this time did show moderate to large bilateral pleural  effusion with compressive atelectasis. The patient at this time was  seen and evaluated and admitted to the hospital under the service of Dr. Radha Barlow and Dr. Joan Hurley. The patient was seen at this time.   Exam at this  time revealed a 81-year-old white male who is alert and responsive,  resting relatively comfortable at the present time. From a cardiac  standpoint view, the patient currently denies any chest pain per se. He  does admit to have some exertional dyspnea. Radiographic findings at  this time did show abnormal CAT scan. The patient did undergo removal  of 1500 mL of fluid from his right lung. This was dark yellow. He  denies any prior cardiac history and his EKG did show a normal sinus  rhythm. He denies any prior cardiac history. He denies any swelling of  his lower extremities. He actually denies any orthopnea, but did have a  chronic nonproductive cough. He denies any fever or chills. The only  thing, which is interesting to note at this time that he has recently  traveled to [place] for a conference. This was approximately three  weeks ago. He has been checked for COVID on several times, but so far  has been negative. His proBNP is elevated and he has no prior cardiac  history. Respiratory golden, he denies any fever or chills associated  with it. He does have a nonproductive cough. Gastrointestinal wise, he  denies any recent change in bowel habits, hematochezia, or rectal  bleeding, nausea, vomiting, diarrhea, or constipation. Genitourinary  wise, he does relate to having a frequency with urination and does have  nocturia x2 or 3. Otherwise, he denies any history of stroke, TIAs,  etc.  He did seem to be alert and responsive on my evaluation, although  in the emergency room they did think that he did have some underlying  memory impairment for which the CAT scan of the head was obtained. His  only other history which is significant is that he does drink wine on a  daily basis and two glasses which is approximately 8 to 10 ounces total.  He currently is improving at the present time.     LABS[de-identified]  Lab Results   Component Value Date    WBC 8.2 02/09/2021    HGB 14.4 02/09/2021    HCT 43.9 02/09/2021    PLT 173 02/09/2021     02/09/2021    K 3.9 02/09/2021     (H) 02/09/2021    CREATININE 1.2 02/09/2021    BUN 22 02/09/2021    CO2 24 02/09/2021    GLUCOSE 103 (H) 02/09/2021    ALT 18 02/08/2021    AST 14 02/08/2021    INR 1.3 02/08/2021     Lab Results   Component Value Date    INR 1.3 02/08/2021    PROTIME 14.4 (H) 02/08/2021      Lab Results   Component Value Date    TSH 1.760 02/06/2021     Lab Results   Component Value Date    TRIG 79 02/06/2021    TRIG 210 (H) 11/21/2014    TRIG 103 11/21/2013     Lab Results   Component Value Date    HDL 36 02/06/2021    HDL 57 11/21/2014    HDL 68 11/21/2013     Lab Results   Component Value Date    LDLCALC 108 (H) 02/06/2021    LDLCALC 109 (H) 11/21/2014    LDLCALC 144 (H) 11/21/2013     Lab Results   Component Value Date    LABA1C 5.7 (H) 02/06/2021       IMAGING:  Echo Complete    Result Date: 2/7/2021  Transthoracic Echocardiography Report (TTE)  Demographics   Patient Name        Jasiel Coley Gender                Male   Medical Record      [id number]        Room Number           2958  Number   Account #           [de-identified]       Procedure Date        02/07/2021   Corporate ID                        Ordering Physician    Scar Mariscal DO   Accession Number    [id number]      Referring Physician   Date of Birth       1940      Sonographer           Antonia CARL   Age                 68 year(s)      Interpreting          Scar Mariscal DO                                      Physician                                       Any Other  Procedure Type of Study   TTE procedure:Echo Complete W/Doppler & Color Flow. Procedure Date Date: 02/07/2021 Start: 08:39 AM Study Location: Echo Lab Technical Quality: Adequate visualization Indications:Cardiomyopathy. Patient Status: ASAP Height: 66 inches Weight: 164 pounds BSA: 1.84 m^2 BMI: 26.47 kg/m^2 Rhythm: Within normal limits HR: 83 bpm BP: 109/70 mmHg  Findings   Left Ventricle  Moderately dilated left ventricle. Cusp Separation: 1.8 cmLA  Dimension: 6.2  LV Volume Diastolic: 659.9   Dimension: 5 cmAO Root  cm              ml                           Dimension: 2.9 cm  LV FS:3.2 %     LV Volume Systolic: 857.4 ml  LV PW           LV EDV/LV EDV Index: 584.2  Diastolic: 1.2  WP/107 SH/H^7SP ESV/LV ESV  cm              Index: 179.1 ml/97ml/ m^2    RV Diastolic Dimension: 3 cm  Septum          EF Calculated: 6.1 %  Diastolic: 1.5  LV Mass Index: 211 l/min*m^2  cm              LV Length: 9.4 cm            LA volume/Index: 89.1 ml  CO: 3.72 l/min                               /48. 44ml/m^2  CI: 2.02        LVOT: 2.3 cm  l/m*m^2  LV Mass: 388.85  g  Doppler Measurements & Calculations   MV Peak E-Wave:     AV Peak Velocity: 1.25 LVOT Peak Velocity: 0.78 m/s  0.81 m/s            m/s                    LVOT Mean Velocity: 0.47 m/s  MV Peak A-Wave:     AV Peak Gradient: 6.22 LVOT Peak Gradient: 2.4  0.25 m/s            mmHg                   mmHgLVOT Mean Gradient: 1.1  MV E/A Ratio: 3.29  AV Mean Velocity: 0.87 mmHg  MV Peak Gradient:   m/s  4.9 mmHg            AV Mean Gradient: 3.4  MV Mean Gradient:   mmHg  1.7 mmHg            AV VTI: 20.7 cm        TR Velocity:3.03 m/s  MV Mean Velocity:   AV Area                TR Gradient:36.63 mmHg  0.61 m/s            (Continuity):2.17 cm^2 PV Peak Velocity: 0.68 m/s  MV Deceleration     AV Deceleration Time:  PV Peak Gradient: 1.87 mmHg  Time: 83 msec       888.6 msec  MV P1/2t: 82.7 msec LVOT VTI: 10.8 cm  MVA by PHT:2.66                            NC ED Velocity: 1.23 m/s  cm^2  MV Area  (continuity): 2  cm^2  MV E' Septal  Velocity: 0.03 m/s  MV E' Lateral  Velocity: 2 m/s  [email or web address] DocKey=[id number]    Ct Head Wo Contrast    Result Date: 2/5/2021  EXAMINATION: CT OF THE HEAD WITHOUT CONTRAST  2/5/2021 1:36 pm TECHNIQUE: CT of the head was performed without the administration of intravenous contrast. Dose modulation, iterative reconstruction, and/or weight based adjustment of the mA/kV was utilized to reduce the radiation dose to as low as reasonably achievable. COMPARISON: None. HISTORY: ORDERING SYSTEM PROVIDED HISTORY: AMS TECHNOLOGIST PROVIDED HISTORY: Reason for exam:->AMS Has a \"code stroke\" or \"stroke alert\" been called? ->No Decision Support Exception->Emergency Medical Condition (MA) FINDINGS: BRAIN/VENTRICLES: There is no acute intracranial hemorrhage, mass effect or midline shift. No abnormal extra-axial fluid collection. The gray-white differentiation is maintained without evidence of an acute infarct. There is no evidence of hydrocephalus. ORBITS: The visualized portion of the orbits demonstrate no acute abnormality. SINUSES: The visualized paranasal sinuses and mastoid air cells demonstrate no acute abnormality. SOFT TISSUES/SKULL:  No acute abnormality of the visualized skull or soft tissues. No acute intracranial abnormality. Xr Chest 1 View    Result Date: 2/8/2021  EXAMINATION: ONE XRAY VIEW OF THE CHEST 2/8/2021 10:28 am COMPARISON: February 5, 2021 HISTORY: ORDERING SYSTEM PROVIDED HISTORY: pneumo TECHNOLOGIST PROVIDED HISTORY: Reason for exam:->pneumo FINDINGS: Since the prior study, there has been a significant reduction in volume of left pleural fluid following thoracentesis. No evidence of a pneumothorax. Mild bibasilar atelectasis is present. No evidence of a sizable residual or recurrent pleural effusion. No acute osseous abnormality. Heart size is unable to be accurately assessed on this single portable view of the chest, but appears to be stable. Interval reduction in volume of left pleural fluid following thoracentesis. No convincing evidence of a pneumothorax. Xr Chest 1 View    Result Date: 2/5/2021  EXAMINATION: ONE XRAY VIEW OF THE CHEST 2/5/2021 4:33 pm COMPARISON: None.   Correlated with CTA of the chest from earlier today. HISTORY: ORDERING SYSTEM PROVIDED HISTORY: pneumothorax TECHNOLOGIST PROVIDED HISTORY: Reason for exam:->pneumothorax FINDINGS: There has been a significant interval reduction in the volume of right pleural fluid following thoracentesis. No convincing evidence of a pneumothorax. A moderate left pleural effusion remains. Heart size is unable to be accurately assessed on this single portable view of the chest. Left basilar airspace opacifications likely on the basis of atelectasis. Interval reduction in volume of right pleural fluid following thoracentesis. No convincing evidence of a pneumothorax. Persistent moderate size left pleural effusion with left basilar airspace opacification, likely atelectasis. Cta Chest W Contrast    Result Date: 2/5/2021  EXAMINATION: CTA OF THE CHEST 2/5/2021 1:36 pm TECHNIQUE: CTA of the chest was performed after the administration of intravenous contrast.  Multiplanar reformatted images are provided for review. MIP images are provided for review. Dose modulation, iterative reconstruction, and/or weight based adjustment of the mA/kV was utilized to reduce the radiation dose to as low as reasonably achievable. COMPARISON: None. HISTORY: ORDERING SYSTEM PROVIDED HISTORY: r/o PE, shortness of breath TECHNOLOGIST PROVIDED HISTORY: Reason for exam:->r/o PE Decision Support Exception->Emergency Medical Condition (MA) FINDINGS: Respiratory motion in bilateral lung bases limits evaluation for small pulmonary emboli. Pulmonary Arteries: Pulmonary arteries are adequately opacified for evaluation. No evidence of large intraluminal filling defect to suggest pulmonary embolism. Main pulmonary artery is normal in caliber. Mediastinum: No evidence of mediastinal lymphadenopathy. There is cardiomegaly. There is no acute abnormality of the thoracic aorta. Lungs/pleura: The airways are clear. Moderate large bilateral pleural effusions with compressive atelectasis.   Prominent of the interstitium bilaterally. Upper Abdomen: Limited images of the upper abdomen are unremarkable. Soft Tissues/Bones: No acute bone or soft tissue abnormality. No evidence of pulmonary embolism. Respiratory motion limits evaluation for small pulmonary emboli. Moderate pulmonary and interstitial edema. Moderate to large bilateral pleural effusions with compressive atelectasis. Ir Guided Thoracentesis Pleural    Result Date: 2/8/2021  PROCEDURE: Phyliss Person LEFT THORACENTESIS 2/8/2021 HISTORY: ORDERING SYSTEM PROVIDED HISTORY: Thoracentesis-moderate-sized left pleural effusion TECHNOLOGIST PROVIDED HISTORY: Reason for exam:->Thoracentesis-moderate-sized left pleural effusion Which side should the procedure be performed? ->Left TECHNIQUE: Informed consent was obtained after a detailed explanation of the procedure including risks, benefits, and alternatives. Universal protocol was performed. The left chest was prepped and draped in sterile fashion and local anesthesia was achieved with lidocaine. A 5 [place] needle sheath was advanced under ultrasound guidance into pleural effusion and thoracentesis was performed. The patient tolerated the procedure well. FINDINGS: A total of 800 cc of straw-colored pleural fluid was removed. Status post ultrasound guided thoracentesis. Ir Guided Thoracentesis Pleural    Result Date: 2/5/2021  PROCEDURE: ULTRASOUNDGUIDED RIGHT THORACENTESIS 2/5/2021 HISTORY: ORDERING SYSTEM PROVIDED HISTORY: pleural effusion TECHNOLOGIST PROVIDED HISTORY: Reason for exam:->pleural effusion Which side should the procedure be performed?->Radiologist Recommendation TECHNIQUE: Informed consent was obtained after a detailed explanation of the procedure including risks, benefits, and alternatives. Universal protocol was performed. The right chest was prepped and draped in sterile fashion and local anesthesia was achieved with lidocaine.   A 5 Montenegrin needle sheath was advanced under ultrasound guidance into pleural effusion and thoracentesis was performed. The patient tolerated the procedure well. FINDINGS: A total of 1500 cc of straw-colored pleural fluid was removed. Status post ultrasound guided thoracentesis. HOSPITAL COURSE:   Madalyn Pool did well for hospitalization. Initially presenting with increased shortness of breath, CT scan showed large bilateral pleural effusions. The etiology of these were initially unclear. He underwent thoracentesis February 5 of 1500 cc of straw-colored fluid from the right sided pleural effusion. Extensive work-up was undertaken and he was found to have severe cardiomyopathy. Multiple etiologies of this for possible however ischemic cardiomyopathy was felt to be strongly playing a role and arrangements for him made for heart catheterization at [facility] today. Also to be considered as the patient's history of moderate to significant daily alcohol consumption. As dilated cardiomyopathy was noted on echocardiogram, we have discussed the absolute need for alcohol cessation with Madalyn Pool as well as strict compliance with the medication and treatment regimen of his severe cardiomyopathy and he voiced understanding and agreement. Thoracentesis was undertaken February 8 of the left pleural effusion with 800 cc of fluid removal.  His respiratory symptoms were much improved. Overall he improved to the degree that he could be transition to [facility] to undergo cardiac catheterization and further medical care at the direction of the internal medicine and cardiology team at that facility. GDMT will need to be continued and optimized prior to discharge as he was on very minimal medications prior to this hospitalization.      BRIEF PHYSICAL EXAMINATION AND LABORATORIES ON DAY OF DISCHARGE:  VITALS:  /74   Pulse 78   Temp 98.3 °F (36.8 °C) (Oral)   Resp 18   Ht 5' 6\" (1.676 m)   Wt 156 lb (70.8 kg)   SpO2 93%   BMI 25.18 kg/m² HEENT:  PERRLA. EOMI. Sclera clear. Buccal mucosa moist.    Neck:  Supple. Trachea midline. No thyromegaly. No JVD. No bruits. Heart:  Rhythm regular, rate controlled. Systolic murmur appreciated. .    Lungs:  Symmetrical.  Improved aeration bilaterally, now only minimally diminished in bilateral bases. Clear to auscultation bilaterally. No wheezes. No rhonchi. No rales. Abdomen: Soft. Non-tender. Non-distended. Bowel sounds positive. No organomegaly or masses. No pain on palpation    Extremities:  Peripheral pulses present. No peripheral edema. No ulcers. Neurologic:  Alert x 3. No focal deficit. Cranial nerves grossly intact. Skin:  No petechia. No hemorrhage. No wounds. DISPOSITION:  Giovani Oxford condition is much improved. He will be transferred to [facility] for heart catheterization today and further disposition as per the internal medicine and cardiology team.  Both he and his fiancée were updated extensively at the bedside.   Current Facility-Administered Medications   Medication Dose Route Frequency Provider Last Rate Last Admin    furosemide (LASIX) injection 20 mg  20 mg Intravenous BID JESUS Miller - CNP   20 mg at 03/01/31 2470    folic acid (FOLVITE) tablet 1 mg  1 mg Oral Daily Naya Rangel, DO   1 mg at 02/08/21 1314    aspirin chewable tablet 81 mg  81 mg Oral Daily Flavia Rodriguez MD   Stopped at 02/08/21 0902    magnesium oxide (MAG-OX) tablet 400 mg  400 mg Oral BID Ese Vasquez, DO   400 mg at 02/08/21 2055    metoprolol succinate (TOPROL XL) extended release tablet 25 mg  25 mg Oral Daily Grant Vasquez, DO   25 mg at 02/09/21 3062    colchicine (COLCRYS) tablet 0.6 mg  0.6 mg Oral BID Ese Vasquez, DO   0.6 mg at 02/09/21 6325    0.9 % sodium chloride bolus  1,000 mL Intravenous Once [address], DO        sodium chloride flush 0.9 % injection 10 mL  10 mL Intravenous 2 times per day Ese Vasquez, DO   10 mL at 02/09/21 6541    sodium chloride flush 0.9 % injection 10 mL  10 mL Intravenous PRN Grant E Bisel, DO        polyethylene glycol (GLYCOLAX) packet 17 g  17 g Oral Daily PRN Juan Girt Bisel, DO        acetaminophen (TYLENOL) tablet 650 mg  650 mg Oral Q6H PRN Juan Girt Bisel, DO        Or    acetaminophen (TYLENOL) suppository 650 mg  650 mg Rectal Q6H PRN Grant HOOKER Bisel, DO        ondansetron (ZOFRAN-ODT) disintegrating tablet 4 mg  4 mg Oral Q8H PRN Grant Larsenel, DO        Or    ondansetron (ZOFRAN) injection 4 mg  4 mg Intravenous Q6H PRN Grant Larsenel, DO        enoxaparin (LOVENOX) injection 30 mg  30 mg Subcutaneous Daily Grant Larsenel, DO   30 mg at 02/08/21 1818    allopurinol (ZYLOPRIM) tablet 100 mg  100 mg Oral Daily Grant Larsenel, DO   100 mg at 02/09/21 0635    pantoprazole (PROTONIX) tablet 40 mg  40 mg Oral QAM AC Grant HOOKER Bisel, DO   40 mg at 02/08/21 9162    perflutren lipid microspheres (DEFINITY) injection 1.65 mg  1.5 mL Intravenous ONCE PRN Juan Girt Bisel, DO        sacubitril-valsartan (ENTRESTO) 24-26 MG per tablet 1 tablet  1 tablet Oral BID Marval Siemens, DO   1 tablet at 02/09/21 8957    ipratropium-albuterol (DUONEB) nebulizer solution 1 ampule  1 ampule Inhalation Q4H Grant Vasquez, DO   1 ampule at 02/09/21 9054    thiamine mononitrate tablet 100 mg  100 mg Oral Daily Grant E Bisel, DO   100 mg at 02/08/21 1266    LORazepam (ATIVAN) tablet 0.5 mg  0.5 mg Oral Q4H PRN Juan Girt Bisel, DO   0.5 mg at 02/07/21 1605     FOLLOW UP/INSTRUCTIONS:  · This patient is instructed to follow-up with his primary care physician. · Patient is instructed to follow-up with the consults listed above as directed by them. · he is instructed to resume home medications and take new medications as indicated in the list above. · If the patient has a recurrence of symptoms, he is instructed to go to the ED.     Preparing for this patient's discharge, including paperwork, orders, instructions, and meeting with patient did require > 40 minutes.     JESUS Morris - RIRI     2/9/2021  9:38 AM

## 2021-02-09 NOTE — CARE COORDINATION
2/9/2021 1040 CM note: NEGATIVE COVID 2/5/21. Pt for cardiac catheterization at [facility] today. Free 30 day trial card for entresto explained and given to pt;isabella welch. [employer], will obtain prior auth for life vest and deliver vest to [id number]  27.  Dante RN

## 2021-02-10 ENCOUNTER — APPOINTMENT (OUTPATIENT)
Dept: GENERAL RADIOLOGY | Age: 81
DRG: 286 | End: 2021-02-10
Attending: INTERNAL MEDICINE
Payer: MEDICARE

## 2021-02-10 PROBLEM — B20 HIV (HUMAN IMMUNODEFICIENCY VIRUS INFECTION) (HCC): Status: ACTIVE | Noted: 2021-02-10

## 2021-02-10 PROBLEM — I42.8 NONISCHEMIC CARDIOMYOPATHY (HCC): Status: ACTIVE | Noted: 2021-02-10

## 2021-02-10 PROBLEM — Z21 HIV ANTIBODY POSITIVE (HCC): Status: ACTIVE | Noted: 2021-02-10

## 2021-02-10 LAB
ANION GAP SERPL CALCULATED.3IONS-SCNC: 14 MMOL/L (ref 7–16)
BASOPHILS ABSOLUTE: 0.04 E9/L (ref 0–0.2)
BASOPHILS RELATIVE PERCENT: 0.6 % (ref 0–2)
BLOOD CULTURE, ROUTINE: NORMAL
BUN BLDV-MCNC: 25 MG/DL (ref 8–23)
CALCIUM SERPL-MCNC: 8.9 MG/DL (ref 8.6–10.2)
CHLORIDE BLD-SCNC: 107 MMOL/L (ref 98–107)
CO2: 23 MMOL/L (ref 22–29)
CREAT SERPL-MCNC: 1.2 MG/DL (ref 0.7–1.2)
CULTURE, BLOOD 2: NORMAL
EOSINOPHILS ABSOLUTE: 0.13 E9/L (ref 0.05–0.5)
EOSINOPHILS RELATIVE PERCENT: 1.9 % (ref 0–6)
GFR AFRICAN AMERICAN: >60
GFR NON-AFRICAN AMERICAN: 59 ML/MIN/1.73
GLUCOSE BLD-MCNC: 86 MG/DL (ref 74–99)
HCT VFR BLD CALC: 46.8 % (ref 37–54)
HEMOGLOBIN: 15.3 G/DL (ref 12.5–16.5)
IMMATURE GRANULOCYTES #: 0.01 E9/L
IMMATURE GRANULOCYTES %: 0.1 % (ref 0–5)
LYMPHOCYTES ABSOLUTE: 0.97 E9/L (ref 1.5–4)
LYMPHOCYTES RELATIVE PERCENT: 14.5 % (ref 20–42)
MCH RBC QN AUTO: 32 PG (ref 26–35)
MCHC RBC AUTO-ENTMCNC: 32.7 % (ref 32–34.5)
MCV RBC AUTO: 97.9 FL (ref 80–99.9)
MONOCYTES ABSOLUTE: 0.48 E9/L (ref 0.1–0.95)
MONOCYTES RELATIVE PERCENT: 7.2 % (ref 2–12)
NEUTROPHILS ABSOLUTE: 5.05 E9/L (ref 1.8–7.3)
NEUTROPHILS RELATIVE PERCENT: 75.7 % (ref 43–80)
PDW BLD-RTO: 13.5 FL (ref 11.5–15)
PLATELET # BLD: 164 E9/L (ref 130–450)
PMV BLD AUTO: 11.9 FL (ref 7–12)
POTASSIUM SERPL-SCNC: 3.8 MMOL/L (ref 3.5–5)
RBC # BLD: 4.78 E12/L (ref 3.8–5.8)
SODIUM BLD-SCNC: 144 MMOL/L (ref 132–146)
URIC ACID, SERUM: 10.9 MG/DL (ref 3.4–7)
WBC # BLD: 6.7 E9/L (ref 4.5–11.5)

## 2021-02-10 PROCEDURE — G0378 HOSPITAL OBSERVATION PER HR: HCPCS

## 2021-02-10 PROCEDURE — 96372 THER/PROPH/DIAG INJ SC/IM: CPT

## 2021-02-10 PROCEDURE — 6370000000 HC RX 637 (ALT 250 FOR IP): Performed by: INTERNAL MEDICINE

## 2021-02-10 PROCEDURE — 6360000002 HC RX W HCPCS: Performed by: NURSE PRACTITIONER

## 2021-02-10 PROCEDURE — 96374 THER/PROPH/DIAG INJ IV PUSH: CPT

## 2021-02-10 PROCEDURE — 80048 BASIC METABOLIC PNL TOTAL CA: CPT

## 2021-02-10 PROCEDURE — 84550 ASSAY OF BLOOD/URIC ACID: CPT

## 2021-02-10 PROCEDURE — 87536 HIV-1 QUANT&REVRSE TRNSCRPJ: CPT

## 2021-02-10 PROCEDURE — 85025 COMPLETE CBC W/AUTO DIFF WBC: CPT

## 2021-02-10 PROCEDURE — 2140000000 HC CCU INTERMEDIATE R&B

## 2021-02-10 PROCEDURE — 36415 COLL VENOUS BLD VENIPUNCTURE: CPT

## 2021-02-10 PROCEDURE — 6360000002 HC RX W HCPCS: Performed by: INTERNAL MEDICINE

## 2021-02-10 PROCEDURE — 2580000003 HC RX 258: Performed by: INTERNAL MEDICINE

## 2021-02-10 PROCEDURE — APPSS30 APP SPLIT SHARED TIME 16-30 MINUTES: Performed by: NURSE PRACTITIONER

## 2021-02-10 PROCEDURE — 94640 AIRWAY INHALATION TREATMENT: CPT

## 2021-02-10 PROCEDURE — 6370000000 HC RX 637 (ALT 250 FOR IP): Performed by: NURSE PRACTITIONER

## 2021-02-10 PROCEDURE — 71045 X-RAY EXAM CHEST 1 VIEW: CPT

## 2021-02-10 RX ORDER — FUROSEMIDE 10 MG/ML
40 INJECTION INTRAMUSCULAR; INTRAVENOUS 2 TIMES DAILY
Status: COMPLETED | OUTPATIENT
Start: 2021-02-10 | End: 2021-02-11

## 2021-02-10 RX ORDER — SPIRONOLACTONE 25 MG/1
12.5 TABLET ORAL DAILY
Status: DISCONTINUED | OUTPATIENT
Start: 2021-02-10 | End: 2021-02-11 | Stop reason: HOSPADM

## 2021-02-10 RX ORDER — FUROSEMIDE 10 MG/ML
40 INJECTION INTRAMUSCULAR; INTRAVENOUS 2 TIMES DAILY
Status: DISCONTINUED | OUTPATIENT
Start: 2021-02-10 | End: 2021-02-10

## 2021-02-10 RX ORDER — ASPIRIN 81 MG/1
81 TABLET, CHEWABLE ORAL DAILY
Status: DISCONTINUED | OUTPATIENT
Start: 2021-02-10 | End: 2021-02-11 | Stop reason: HOSPADM

## 2021-02-10 RX ADMIN — FUROSEMIDE 40 MG: 10 INJECTION, SOLUTION INTRAVENOUS at 18:20

## 2021-02-10 RX ADMIN — ATORVASTATIN CALCIUM 40 MG: 40 TABLET, FILM COATED ORAL at 21:09

## 2021-02-10 RX ADMIN — SPIRONOLACTONE 12.5 MG: 25 TABLET ORAL at 12:48

## 2021-02-10 RX ADMIN — IPRATROPIUM BROMIDE AND ALBUTEROL SULFATE 3 ML: .5; 3 SOLUTION RESPIRATORY (INHALATION) at 13:40

## 2021-02-10 RX ADMIN — PANTOPRAZOLE SODIUM 40 MG: 40 TABLET, DELAYED RELEASE ORAL at 06:04

## 2021-02-10 RX ADMIN — SACUBITRIL AND VALSARTAN 1 TABLET: 24; 26 TABLET, FILM COATED ORAL at 21:09

## 2021-02-10 RX ADMIN — SODIUM CHLORIDE, PRESERVATIVE FREE 10 ML: 5 INJECTION INTRAVENOUS at 12:46

## 2021-02-10 RX ADMIN — SODIUM CHLORIDE, PRESERVATIVE FREE 10 ML: 5 INJECTION INTRAVENOUS at 18:20

## 2021-02-10 RX ADMIN — ALLOPURINOL 100 MG: 100 TABLET ORAL at 09:47

## 2021-02-10 RX ADMIN — IPRATROPIUM BROMIDE AND ALBUTEROL SULFATE 3 ML: .5; 3 SOLUTION RESPIRATORY (INHALATION) at 20:53

## 2021-02-10 RX ADMIN — COLCHICINE 0.6 MG: 0.6 TABLET ORAL at 21:09

## 2021-02-10 RX ADMIN — MAGNESIUM GLUCONATE 500 MG ORAL TABLET 400 MG: 500 TABLET ORAL at 09:48

## 2021-02-10 RX ADMIN — COLCHICINE 0.6 MG: 0.6 TABLET ORAL at 09:47

## 2021-02-10 RX ADMIN — ENOXAPARIN SODIUM 40 MG: 40 INJECTION SUBCUTANEOUS at 09:47

## 2021-02-10 RX ADMIN — FUROSEMIDE 20 MG: 20 TABLET ORAL at 09:47

## 2021-02-10 RX ADMIN — Medication 10 ML: at 21:09

## 2021-02-10 RX ADMIN — SACUBITRIL AND VALSARTAN 1 TABLET: 24; 26 TABLET, FILM COATED ORAL at 09:47

## 2021-02-10 RX ADMIN — IPRATROPIUM BROMIDE AND ALBUTEROL SULFATE 3 ML: .5; 3 SOLUTION RESPIRATORY (INHALATION) at 17:30

## 2021-02-10 RX ADMIN — METOPROLOL SUCCINATE 25 MG: 25 TABLET, EXTENDED RELEASE ORAL at 12:45

## 2021-02-10 RX ADMIN — MAGNESIUM GLUCONATE 500 MG ORAL TABLET 400 MG: 500 TABLET ORAL at 21:09

## 2021-02-10 RX ADMIN — IPRATROPIUM BROMIDE AND ALBUTEROL SULFATE 3 ML: .5; 3 SOLUTION RESPIRATORY (INHALATION) at 09:23

## 2021-02-10 RX ADMIN — ASPIRIN 81 MG: 81 TABLET, CHEWABLE ORAL at 09:47

## 2021-02-10 ASSESSMENT — PAIN SCALES - GENERAL
PAINLEVEL_OUTOF10: 2
PAINLEVEL_OUTOF10: 0

## 2021-02-10 NOTE — PROGRESS NOTES
[address]    Name: Wero Valerio    Age: 68 y.o. Date of Service: 2/10/2021    Reason for Consultation: Dilated cardiomyopathy, acute systolic CHF    Referring Physician: Lashay Valle MD    History of Present Illness:  Wero Valerio is a 68 y.o. male (new to [facility]) Cardiology -- Dr. Tramaine Kemp) who presented to [facility] on 2/5/2021 for further evaluation of progressive SOB for the past 1 month. His PMH is outlined in detail below (see A/P below). He is a sub-optimal historian. He denies recent chest pain, palpitations, syncope. +orthopnea prior to admission. Respiratory status improved s/p thoracentesis (right sided 1500 cc on 2/5/2021) and s/p left thoracentesis (800 cc on 2/8/2021) but still with AREVALO. He is currently with no acute distress at rest. No new overnight cardiac complaints. SR on EKG and telemetry. He is a dentist.     Interval History:  · SCr 1.2 -->Patient was transferred to [facility] (2/9/2021) for C showing moderate single vessel CAD, nonischemic CMP, Elevated LVEDP at 31 mmHg. · Patient admitted (2/9/2021) for medical management. Review of Systems:   Cardiac: As per HPI  General: No fever, chills  Pulmonary: As per HPI  HEENT: No visual disturbances, difficult swallowing  GI: No nausea, vomiting  : No dysuria, hematuria  Endocrine: No thyroid disease or DM  Musculoskeletal: HAEL x 4, no focal motor deficits  Skin: Intact, no rashes  Neuro: No headache, seizures  Psych: Currently with no depression, anxiety    Past Medical History:  Past Medical History:   Diagnosis Date    Acute on chronic systolic (congestive) heart failure ([address] 2/9/2021    CAD in native artery 2/9/2021    Gout     Torn rotator cuff 2008?     left       Past Surgical History:  Past Surgical History:   Procedure Laterality Date    CARDIAC CATHETERIZATION  02/09/2021    Dr. Estevan Guajardo Right 11/02/2016    COLONOSCOPY  2001    neg   [address] thumb    TONSILLECTOMY      UPPER GASTROINTESTINAL ENDOSCOPY  2001    small hiatal hernia    UPPER GASTROINTESTINAL ENDOSCOPY N/A 4/17/2019    EGD DILATION SAVORY performed by Rico Valiente MD at [address]  4/17/2019    EGD BIOPSY performed by Rico Valiente MD at [address]  4/17/2019    EGD POLYP SNARE performed by Rico Valiente MD at [facility] ENDOSCOPY     Family History:  Non-contributory    Social History:  Social History     Socioeconomic History    Marital status:      Spouse name: Not on file    Number of children: Not on file    Years of education: Not on file    Highest education level: Not on file   Occupational History    Not on file   Social Needs    Financial resource strain: Not on file    Food insecurity     Worry: Not on file     Inability: Not on file    Transportation needs     Medical: Not on file     Non-medical: Not on file   Tobacco Use    Smoking status: Never Smoker    Smokeless tobacco: Never Used    Tobacco comment: smoked cigars 20 years ago on occasion   Substance and Sexual Activity    Alcohol use:  Yes     Alcohol/week: 4.0 standard drinks     Types: 4 Glasses of wine per week     Comment: Daily wine    Drug use: No    Sexual activity: Not on file   Lifestyle    Physical activity     Days per week: Not on file     Minutes per session: Not on file    Stress: Not on file   Relationships    Social connections     Talks on phone: Not on file     Gets together: Not on file     Attends [employer] service: Not on file     Active member of club or organization: Not on file     Attends meetings of clubs or organizations: Not on file     Relationship status: Not on file    Intimate partner violence     Fear of current or ex partner: Not on file     Emotionally abused: Not on file     Physically abused: Not on file     Forced sexual activity: Not on file   Other Topics Concern    Not on file   Social History Narrative    Not on file       Allergies:  No Known Allergies    Home Medications:  Prior to Admission medications    Medication Sig Start Date End Date Taking? Authorizing Provider   aspirin 81 MG chewable tablet Take 1 tablet by mouth daily 2/10/21   JESUS Ferris CNP   magnesium oxide (MAG-OX) 400 MG tablet Take 1 tablet by mouth 2 times daily 2/9/21   JESUS Ferris CNP   LORazepam (ATIVAN) 0.5 MG tablet Take 1 tablet by mouth every 4 hours as needed for Anxiety for up to 30 days.  2/9/21 3/11/21  JESUS Ferris CNP   ipratropium-albuterol (DUONEB) 0.5-2.5 (3) MG/3ML SOLN nebulizer solution Inhale 3 mLs into the lungs every 4 hours 2/9/21   JESUS Ferris CNP   enoxaparin (LOVENOX) 30 MG/0.3ML injection Inject 0.3 mLs into the skin daily 2/9/21   JESUS Ferris CNP   metoprolol succinate (TOPROL XL) 25 MG extended release tablet Take 1 tablet by mouth daily 2/10/21   JESUS Ferris CNP   sacubitril-valsartan (ENTRESTO) 24-26 MG per tablet Take 1 tablet by mouth 2 times daily 2/9/21   JESUS Ferris CNP   furosemide (LASIX) 10 MG/ML injection Infuse 2 mLs intravenously 2 times daily 2/9/21   JESUS Ferris CNP   colchicine (COLCRYS) 0.6 MG tablet Take 1 tablet by mouth 2 times daily 2/9/21   JESUS Ferris CNP   folic acid (FOLVITE) 1 MG tablet Take 1 tablet by mouth daily 2/10/21   JESUS Ferris CNP   polyethylene glycol (GLYCOLAX) 17 g packet Take 17 g by mouth daily as needed for Constipation 2/9/21 3/11/21  JESUS Ferris CNP   pantoprazole (PROTONIX) 40 MG tablet Take 1 tablet by mouth every morning (before breakfast) 2/10/21   JESUS Ferris CNP   thiamine mononitrate 100 MG tablet Take 1 tablet by mouth daily 2/10/21   Vermell Alhaji, APRN - CNP   allopurinol (ZYLOPRIM) 100 MG tablet Take 100 mg by mouth daily    Historical Provider, MD       Current Medications:  Current Facility-Administered Medications   Medication Dose Route Frequency Provider Last Rate Last Admin    aspirin chewable tablet 81 mg  81 mg Oral Daily Kelly Rodriguez, APRN - CNP        0.9 % sodium chloride infusion   Intravenous Once Suze Balderas MD        0.9 % sodium chloride infusion   Intravenous Once Suze Balderas MD        sodium chloride flush 0.9 % injection 10 mL  10 mL Intravenous 2 times per day Suze Balderas MD   10 mL at 02/09/21 2052    sodium chloride flush 0.9 % injection 10 mL  10 mL Intravenous PRN Suze Balderas MD        acetaminophen (TYLENOL) tablet 650 mg  650 mg Oral Q4H PRN Suze Balderas MD        allopurinol (ZYLOPRIM) tablet 100 mg  100 mg Oral Daily Cintia Barrios MD        colchicine (COLCRYS) tablet 0.6 mg  0.6 mg Oral BID Cintia Barrios MD   0.6 mg at 02/09/21 2049    ipratropium-albuterol (DUONEB) nebulizer solution 3 mL  3 mL Inhalation Q4H Cintia Barrios MD   3 mL at 02/10/21 3647    LORazepam (ATIVAN) tablet 0.5 mg  0.5 mg Oral Q4H PRN Cintia Barrios MD        magnesium oxide (MAG-OX) tablet 400 mg  400 mg Oral BID Cintia Barrios MD   400 mg at 02/09/21 2049    pantoprazole (PROTONIX) tablet 40 mg  40 mg Oral QAM AC Cintia Barrios MD   40 mg at 02/10/21 0604    sacubitril-valsartan (ENTRESTO) 24-26 MG per tablet 1 tablet  1 tablet Oral BID Cintia Barrios MD   1 tablet at 02/09/21 2049    metoprolol succinate (TOPROL XL) extended release tablet 25 mg  25 mg Oral Daily Cintia Barrios MD        furosemide (LASIX) tablet 20 mg  20 mg Oral BID Cintia Barrios MD        enoxaparin (LOVENOX) injection 40 mg  40 mg Subcutaneous Daily Cintia Barrios MD        atorvastatin (LIPITOR) tablet 40 mg  40 mg Oral Nightly Cintia Barrios MD   40 mg at 02/09/21 2049      sodium chloride      sodium chloride         Physical Exam:  /68   Pulse 82   Temp 97.8 °F (36.6 °C) (Temporal)   Resp 16   Ht 5' 7\" (1.702 m)   Wt 160 lb (72.6 kg)   SpO2 96%   BMI 25.06 kg/m²   Wt Readings from Last 3 Encounters: 02/09/21 160 lb (72.6 kg)   02/08/21 156 lb (70.8 kg)   04/17/19 164 lb 4.8 oz (74.5 kg)     Appearance: Awake, no acute respiratory distress  Skin: Intact, no rash  Head: Normocephalic, atraumatic  Eyes: EOMI, no conjunctival erythema  ENMT: No pharyngeal erythema, MMM, no rhinorrhea  Neck: Supple, no carotid bruits  Lungs: Decreased BS B/L, no wheezing  Cardiac: Regular rate and rhythm, no murmurs apparent  Abdomen: Soft, nontender, +bowel sounds  Extremities: Moves all extremities x 4, no lower extremity edema  Neurologic: No focal motor deficits apparent, normal mood and affect    Intake/Output:    Intake/Output Summary (Last 24 hours) at 2/10/2021 0939  Last data filed at 2/10/2021 8893  Gross per 24 hour   Intake 600 ml   Output --   Net 600 ml     I/O this shift:  In: 120 [P.O.:120]  Out: -     Laboratory Tests:  Recent Labs     02/08/21  0741 02/09/21  0633 02/10/21  0452   * 143 144   K 3.5 3.9 3.8   * 109* 107   CO2 25 24 23   BUN 22 22 25*   CREATININE 1.2 1.2 1.2   GLUCOSE 104* 103* 86   CALCIUM 8.7 8.6 8.9     Lab Results   Component Value Date    MG 1.9 02/09/2021     Recent Labs     02/08/21  0741   ALKPHOS 70   ALT 18   AST 14   PROT 5.8*   BILITOT 0.6   LABALBU 3.7     Recent Labs     02/08/21  0741 02/09/21  0633 02/10/21  0452   WBC 7.1 8.2 6.7   RBC 4.47 4.40 4.78   HGB 14.3 14.4 15.3   HCT 44.3 43.9 46.8   MCV 99.1 99.8 97.9   MCH 32.0 32.7 32.0   MCHC 32.3 32.8 32.7   RDW 13.6 13.7 13.5    173 164   MPV 11.5 12.3* 11.9     Lab Results   Component Value Date    TROPONINI <0.01 02/05/2021     Lab Results   Component Value Date    INR 1.3 02/08/2021    PROTIME 14.4 (H) 02/08/2021     Lab Results   Component Value Date    TSH 1.760 02/06/2021     Lab Results   Component Value Date    LABA1C 5.7 (H) 02/06/2021     No results found for: EAG  Lab Results   Component Value Date    CHOL 160 02/06/2021    CHOL 208 (H) 11/21/2014    CHOL 233 (H) 11/21/2013     Lab Results   Component Value Date    TRIG 79 02/06/2021    TRIG 210 (H) 11/21/2014    TRIG 103 11/21/2013     Lab Results   Component Value Date    HDL 36 02/06/2021    HDL 57 11/21/2014    HDL 68 11/21/2013     Lab Results   Component Value Date    LDLCALC 108 (H) 02/06/2021    LDLCALC 109 (H) 11/21/2014    LDLCALC 144 (H) 11/21/2013     Lab Results   Component Value Date    LABVLDL 16 02/06/2021    LABVLDL 42 11/21/2014     No results found for: CHOLHDLRATIO  No results for input(s): PROBNP in the last 72 hours. Cardiac Tests:  EKG reviewed (EKG date: 2/5/2021): SR, rate 78, LAD, PRWP    Telemetry reviewed (date: 2/10/2021): SR, rate 70's-80's    CTA chest: 2/5/2021  No evidence of pulmonary embolism.  Respiratory motion limits evaluation for   small pulmonary emboli. Moderate pulmonary and interstitial edema. Moderate to large bilateral pleural effusions with compressive atelectasis. CXR reviewed: 2/5/2021  Interval reduction in volume of right pleural fluid following thoracentesis. No convincing evidence of a pneumothorax. Persistent moderate size left pleural effusion with left basilar airspace   opacification, likely atelectasis. Cardiac catheterization: 2/9/2021    LEFT MAIN:  It is a short and large artery which was almost absent. LAD:  It is a large artery wrapping around the apex and giving rise to  three diagonal branches and several septal perforators. The first and  third diagonal branches were fair in size and the second diagonal was  small. The LAD was calcified in its proximal to mid segment. There was  40% to 50% discrete mid LAD stenosis. LEFT CIRCUMFLEX:  It is a large codominant artery giving rise to a very  small first obtuse marginal branch which is tortuous. It also gives  rise to a fair size second obtuse marginal branch which is tortuous. It  also gives rise to a very small third obtuse marginal branch, then to a  bifurcating PLV branch.   No angiographic stenosis noted in the  circumflex or its branches. RCA:  It is a large codominant artery giving rise to a conus branch,  three right ventricular marginal branches, and a PDA. No angiographic  stenosis noted.     IMPRESSION:  1. Moderate single-vessel CAD. 2.  Elevated LVEDP at 31 mmHg. 3.  Nonischemic cardiomyopathy.       TTE: 2/7/2021   Moderately dilated left ventricle. Mild left ventricular concentric hypertrophy noted. Ejection fraction is visually estimated at 15-20%. Severely depressed ejection fraction. The left atrium is mild-moderately dilated. Interatrial septum appears intact. Moderately dilated right ventricle. Decreased RV function   Mildly enlarged right atrium size. Mild to moderate mitral regurgitation is present. No evidence of mitral valve stenosis. Mild tricuspid regurgitation. RVSP is 46.62 mmHg. Pulmonary hypertension is mild . Regular rhythm. Dilated cardiomyopathy       ASSESSMENT / PLAN:  1. Acute HFrEF: LVEF 15-20%. Clinically decompensated. 2. Nonischemic cardiomyopathy: ? Alcohol induced. 3. Aortic regurgitation / Mild-Mod MR, Mild TR   4. Mod single vessel CAD on Cardiac cath (2/9/2021)  5. Bilateral pleural effusions s/p right sided thoracentesis on 2/5/2021 (1500 cc) s/p left thoracentesis (800 cc) on 2/8/2021  6. History of increased ETOH intake: ? Confusion due to alcohol withdrawal  7. HLD: on statin therapy   8. Mild renal insufficiency -- most recent Cr 1.2 --> 1.3 --> 1.2 (today)  9. Elevated uric acid level -- on allopurinol    - Continue IV lasix (today) --> reassess in am --> potentially transition to PO tomorrow.   - Monitor renal function / Strict I/O's / Daily weights    - Toprol XL and entresto started this admission -- up-titrate doses as able  - Add aldactone  - Continue ASA and Lipitor  - Cardiac catheterization results discussed with patient / Rhonda Canales at the bedside   - ETOH cessation / Consider AA for further counseling.   - Lifevest upon discharge (at bedside)      Above assessment and plan as per Dr. Fabián Chairez.   Electronically signed by JESUS Ahumada CNP on 2/10/21 at 9:53 AM EST

## 2021-02-10 NOTE — CONSULTS
[facility] CONSULT NOTE    Reason for Consult: HIV Ab positive  Requested by: Dr. Chey Ruffin    Chief complaint: Cardiac catheterization    History Obtained From: Patient and EMR     HISTORY Richard              The patient is a 68 y.o. male with history of CAD, gout, transferred to [facility] on 02/09 for cardiac catheterization from [facility] where he initially presented on 02/05 with shortness of breath for 3 weeks found to have large bilateral pleural effusion with compressive atelectasis and acute congestive heart failure with severe systolic dysfunction and ejection fraction of 15 to 20%. On transfer, he was afebrile and hemodynamically stable with no leukocytosis. He underwent cardiac catheterization on 02/09 during which moderate single-vessel CAD was noted. HIV-1/HIV-2 Ab screen sent on 02/06 was positive. He reports no history of injection drug use. He has been sexually active with his current female partner for years. His partner states that she has only been sexually active with patient as well. ID service was subsequently consulted for further recommendations. Past Medical History  Past Medical History:   Diagnosis Date    Acute on chronic systolic (congestive) heart failure ([address] 2/9/2021    CAD in native artery 2/9/2021    Gout     Torn rotator cuff 2008?     left       Current Facility-Administered Medications   Medication Dose Route Frequency Provider Last Rate Last Admin    aspirin chewable tablet 81 mg  81 mg Oral Daily JESUS Figueroa - CNP   81 mg at 02/10/21 3385    spironolactone (ALDACTONE) tablet 12.5 mg  12.5 mg Oral Daily Navneetslim Higgins APRN - CNP   12.5 mg at 02/10/21 1248    furosemide (LASIX) injection 40 mg  40 mg Intravenous BID JESUS Figueroa - CNP        0.9 % sodium chloride infusion   Intravenous Once Alesha Gray MD        0.9 % sodium chloride infusion   Intravenous Once Alesha Gray MD        sodium chloride flush 0.9 % injection 10 mL  10 mL Intravenous 2 times per day Lalita Owens MD   10 mL at 02/09/21 2052    sodium chloride flush 0.9 % injection 10 mL  10 mL Intravenous PRN Lalita Owens MD   10 mL at 02/10/21 1246    acetaminophen (TYLENOL) tablet 650 mg  650 mg Oral Q4H PRN Lalita Owens MD        allopurinol (ZYLOPRIM) tablet 100 mg  100 mg Oral Daily Nilesh Huntley MD   100 mg at 02/10/21 0947    colchicine (COLCRYS) tablet 0.6 mg  0.6 mg Oral BID Nilesh Huntley MD   0.6 mg at 02/10/21 0947    ipratropium-albuterol (DUONEB) nebulizer solution 3 mL  3 mL Inhalation Q4H Nilesh Huntley MD   3 mL at 02/10/21 1340    LORazepam (ATIVAN) tablet 0.5 mg  0.5 mg Oral Q4H PRN Nilesh Huntley MD        magnesium oxide (MAG-OX) tablet 400 mg  400 mg Oral BID Nilesh Huntley MD   400 mg at 02/10/21 0948    pantoprazole (PROTONIX) tablet 40 mg  40 mg Oral QAM AC Nilesh Huntley MD   40 mg at 02/10/21 0604    sacubitril-valsartan (ENTRESTO) 24-26 MG per tablet 1 tablet  1 tablet Oral BID Nilesh Huntley MD   1 tablet at 02/10/21 0675    metoprolol succinate (TOPROL XL) extended release tablet 25 mg  25 mg Oral Daily Nilesh Huntley MD   25 mg at 02/10/21 1245    enoxaparin (LOVENOX) injection 40 mg  40 mg Subcutaneous Daily Nilesh Huntley MD   40 mg at 02/10/21 6275    atorvastatin (LIPITOR) tablet 40 mg  40 mg Oral Nightly Nilesh Huntley MD   40 mg at 02/09/21 2049       No Known Allergies    Surgical History  Past Surgical History:   Procedure Laterality Date    CARDIAC CATHETERIZATION  02/09/2021    Dr. Schaefer Gins Right 11/02/2016    COLONOSCOPY  2001    neg    FRACTURE SURGERY Left 1958    thumb    TONSILLECTOMY      UPPER GASTROINTESTINAL ENDOSCOPY  2001    small hiatal hernia    UPPER GASTROINTESTINAL ENDOSCOPY N/A 4/17/2019    EGD DILATION SAVORY performed by Rosemary Mcgovern MD at [address] [place] [address]  4/17/2019    EGD BIOPSY performed by Rosemary Mcgovern MD at [address] [place] [address] GASTROINTESTINAL ENDOSCOPY  4/17/2019    EGD POLYP SNARE performed by Kareen Garcia MD at [address] History  Social History     Socioeconomic History    Marital status:    Tobacco Use    Smoking status: Never Smoker    Smokeless tobacco: Never Used    Tobacco comment: smoked cigars 20 years ago on occasion   Substance and Sexual Activity    Alcohol use: Yes     Alcohol/week: 4.0 standard drinks     Types: 4 Glasses of wine per week     Comment: Daily wine    Drug use: No       Family Medical History  No family history on file. Review of Systems:  Constitutional: No fever, no chills  Eyes: No vision changes, no retroorbital pain  ENT: No hearing changes, no ear pain  Respiratory: Has cough, no dyspnea  Cardiovascular: No chest pain, no palpitations  Gastrointestinal: No abdominal pain, had diarrhea  Genitourinary: No dysuria, no hematuria  Integumentary: No rash, no itching  Musculoskeletal: No muscle pain, no joint pain  Neurologic: No headache, no numbness in extremities    Physical Examination:  Vitals:    02/10/21 0830 02/10/21 0923 02/10/21 1154 02/10/21 1340   BP: (!) 102/55  123/73    Pulse: 64  68    Resp: 26  18    Temp: 97.4 °F (36.3 °C)  97.3 °F (36.3 °C)    TempSrc: Temporal  Temporal    SpO2: 100% 96% 95% 96%   Weight:       Height:         Constitutional: Alert, not in distress  Eyes: Sclerae anicteric, no conjunctival erythema  ENT: No buccal lesion, no pharyngeal exudates  Neck: No nuchal rigidity, no cervical adenopathy  Lungs: Clear breath sounds, no crackles, no wheezes  Heart: Regular rate and rhythm, no murmurs  Abdomen: Bowel sounds present, soft, nontender  Skin: Warm and dry, no active dermatoses  Musculoskeletal: No joint erythema, no joint swelling    Labs, imaging, and medical records/notes were personally reviewed. Assessment:  HIV-antibody positive    Plan:  Check HIV quantitative PCR. Follow up with me as outpatient.     Case was discussed with Dr. Ramiro Mijares. Thank you for involving me in the care of Kellie Gibbs. I will continue to follow. Please do not hesitate to call for any questions or concerns.     Electronically signed by Edison Severin, MD on 2/10/2021 at 2:54 PM

## 2021-02-10 NOTE — H&P
[address] [facility]  History and Physical      CHIEF COMPLAINT: CHF      HISTORY OF PRESENT ILLNESS:      The patient is a 68 y.o. male patient of Dr. Shadi Jensen ID history of alcohol abuse, who presents with CHF. Patient was admitted initially to [facility]. The patient states that approximately over the past three weeks, he has been having intermittent shortness of breath.  The patient apparently presented to the hospital  here with increasing amount of shortness of breath.  + Orthopnea and PND.  + Leg swelling. No fevers chills cough. Denies Covid exposure. He was found to have systolic dysfunction. He was evaluated by cardiology. He is transferred to [address] [place] [address] for left heart catheterization to evaluate for ischemic heart disease. Denies IV drug use or at risk sexual activity. Past Medical History:    Past Medical History:   Diagnosis Date    Acute on chronic systolic (congestive) heart failure ([address] 2/9/2021    CAD in native artery 2/9/2021    Gout     Torn rotator cuff 2008?     left       Past Surgical History:    Past Surgical History:   Procedure Laterality Date    CARDIAC CATHETERIZATION  02/09/2021    Dr. Sourav Lopez Right 11/02/2016    COLONOSCOPY  2001    neg    FRACTURE SURGERY Left 1958    thumb    TONSILLECTOMY      UPPER GASTROINTESTINAL ENDOSCOPY  2001    small hiatal hernia    UPPER GASTROINTESTINAL ENDOSCOPY N/A 4/17/2019    EGD DILATION SAVORY performed by Tamica Vega MD at [facility]  4/17/2019    EGD BIOPSY performed by Tamica Vega MD at [facility] [facility]  4/17/2019    EGD POLYP SNARE performed by Tamica Vega MD at [place] [address]       Medications Prior to Admission:    Medications Prior to Admission: aspirin 81 MG chewable tablet, Take 1 tablet by mouth daily  magnesium oxide (MAG-OX) 400 MG tablet, Take 1 tablet by mouth 2 times daily  LORazepam (ATIVAN) 0.5 MG tablet, Take 1 tablet by mouth every 4 hours as needed for Anxiety for up to 30 days. ipratropium-albuterol (DUONEB) 0.5-2.5 (3) MG/3ML SOLN nebulizer solution, Inhale 3 mLs into the lungs every 4 hours  enoxaparin (LOVENOX) 30 MG/0.3ML injection, Inject 0.3 mLs into the skin daily  metoprolol succinate (TOPROL XL) 25 MG extended release tablet, Take 1 tablet by mouth daily  sacubitril-valsartan (ENTRESTO) 24-26 MG per tablet, Take 1 tablet by mouth 2 times daily  furosemide (LASIX) 10 MG/ML injection, Infuse 2 mLs intravenously 2 times daily  colchicine (COLCRYS) 0.6 MG tablet, Take 1 tablet by mouth 2 times daily  folic acid (FOLVITE) 1 MG tablet, Take 1 tablet by mouth daily  polyethylene glycol (GLYCOLAX) 17 g packet, Take 17 g by mouth daily as needed for Constipation  pantoprazole (PROTONIX) 40 MG tablet, Take 1 tablet by mouth every morning (before breakfast)  thiamine mononitrate 100 MG tablet, Take 1 tablet by mouth daily  allopurinol (ZYLOPRIM) 100 MG tablet, Take 100 mg by mouth daily    Allergies:    Patient has no known allergies. Social History:    reports that he has never smoked. He has never used smokeless tobacco. He reports current alcohol use of about 4.0 standard drinks of alcohol per week. He reports that he does not use drugs. Family History:   pt denies contributory history     REVIEW OF SYSTEMS:  As above in the HPI, otherwise negative    PHYSICAL EXAM:    Vitals:  BP (!) 102/55   Pulse 64   Temp 97.4 °F (36.3 °C) (Temporal)   Resp 26   Ht 5' 7\" (1.702 m)   Wt 160 lb (72.6 kg)   SpO2 96%   BMI 25.06 kg/m²     General:  Awake, alert, oriented X 3. Well developed, well nourished, well groomed. No apparent distress. HEENT:  Normocephalic, atraumatic. Pupils equal, round, reactive to light. No scleral icterus. No conjunctival injection. Normal lips, teeth, and gums. No nasal discharge.   Neck:  Supple  Heart:  RRR, no murmurs, gallops, rubs  Lungs:  CTA bilaterally, bilat symmetrical expansion, no wheeze, rales, or rhonchi  Abdomen:   Bowel sounds present, soft, nontender, no masses, no organomegaly, no peritoneal signs  Extremities:  No clubbing, cyanosis, or edema  Skin:  Warm and dry, no open lesions or rash  Neuro:  Cranial nerves 2-12 intact, no focal deficits  Breast: deferred  Rectal: deferred  Genitalia:  deferred    LABS:    CBC with Differential:    Lab Results   Component Value Date    WBC 6.7 02/10/2021    RBC 4.78 02/10/2021    HGB 15.3 02/10/2021    HCT 46.8 02/10/2021     02/10/2021    MCV 97.9 02/10/2021    MCH 32.0 02/10/2021    MCHC 32.7 02/10/2021    RDW 13.5 02/10/2021    SEGSPCT 71 11/21/2013    LYMPHOPCT 14.5 02/10/2021    MONOPCT 7.2 02/10/2021    BASOPCT 0.6 02/10/2021    MONOSABS 0.48 02/10/2021    LYMPHSABS 0.97 02/10/2021    EOSABS 0.13 02/10/2021    BASOSABS 0.04 02/10/2021     CMP:    Lab Results   Component Value Date     02/10/2021    K 3.8 02/10/2021     02/10/2021    CO2 23 02/10/2021    BUN 25 02/10/2021    CREATININE 1.2 02/10/2021    GFRAA >60 02/10/2021    LABGLOM 59 02/10/2021    GLUCOSE 86 02/10/2021    GLUCOSE 85 04/01/2011    PROT 5.8 02/08/2021    LABALBU 3.7 02/08/2021    LABALBU 4.1 04/01/2011    CALCIUM 8.9 02/10/2021    BILITOT 0.6 02/08/2021    ALKPHOS 70 02/08/2021    AST 14 02/08/2021    ALT 18 02/08/2021     Magnesium:    Lab Results   Component Value Date    MG 1.9 02/09/2021     Phosphorus:    Lab Results   Component Value Date    PHOS 3.0 02/09/2021     PT/INR:    Lab Results   Component Value Date    PROTIME 14.4 02/08/2021    INR 1.3 02/08/2021     Last 3 Troponin:    Lab Results   Component Value Date    TROPONINI <0.01 02/05/2021     U/A:    Lab Results   Component Value Date    COLORU Yellow 02/05/2021    PROTEINU Negative 02/05/2021    PHUR 5.0 02/05/2021    WBCUA 0-1 02/05/2021    RBCUA 0-1 02/05/2021    BACTERIA NONE SEEN 02/05/2021    CLARITYU Clear 02/05/2021 SPECGRAV 1.015 02/05/2021    LEUKOCYTESUR Negative 02/05/2021    UROBILINOGEN 0.2 02/05/2021    BILIRUBINUR Negative 02/05/2021    BLOODU SMALL 02/05/2021    GLUCOSEU Negative 02/05/2021     ABG:  No results found for: PH, PCO2, PO2, HCO3, BE, THGB, TCO2, O2SAT  HgBA1c:    Lab Results   Component Value Date    LABA1C 5.7 02/06/2021     FLP:    Lab Results   Component Value Date    TRIG 79 02/06/2021    HDL 36 02/06/2021    LDLCALC 108 02/06/2021    LABVLDL 16 02/06/2021     TSH:    Lab Results   Component Value Date    TSH 1.760 02/06/2021       XR CHEST PORTABLE    (Results Pending)       ASSESSMENT:      Active Problems:    Pleural effusion    Heart failure of unknown type (HCC)    CAD in native artery    Acute on chronic systolic (congestive) heart failure (HCC)    HIV (human immunodeficiency virus infection) (HCC)    Nonischemic cardiomyopathy (HCC)  Resolved Problems:    * No resolved hospital problems. *      PLAN:    29-year-old male history of gout alcohol abuse presents with CHF.   Patient is transferred from [facility] for left heart catheterization which revealed nonobstructive CAD    GDMT  Cardiology consult appreciated  ID consult  Medications for other co morbidities cont as appropriate w dosage adjustments as necessary   Monitor for withdrawal, thiamine/MVI  Alcohol cessation/moderation counseled  Advised attendance of peer support meetings such as AA and/or counseling if cessation proves difficult  PT/OT  DVT PPx  DC planning     Electronically signed by Tammie Epstein MD on 2/10/2021 at 10:19 AM

## 2021-02-11 VITALS
DIASTOLIC BLOOD PRESSURE: 66 MMHG | OXYGEN SATURATION: 93 % | TEMPERATURE: 98 F | HEART RATE: 68 BPM | HEIGHT: 67 IN | BODY MASS INDEX: 25.02 KG/M2 | RESPIRATION RATE: 16 BRPM | SYSTOLIC BLOOD PRESSURE: 129 MMHG | WEIGHT: 159.4 LBS

## 2021-02-11 LAB
ANION GAP SERPL CALCULATED.3IONS-SCNC: 13 MMOL/L (ref 7–16)
BUN BLDV-MCNC: 25 MG/DL (ref 8–23)
CALCIUM SERPL-MCNC: 8.5 MG/DL (ref 8.6–10.2)
CHLORIDE BLD-SCNC: 109 MMOL/L (ref 98–107)
CO2: 24 MMOL/L (ref 22–29)
CREAT SERPL-MCNC: 1.3 MG/DL (ref 0.7–1.2)
GFR AFRICAN AMERICAN: >60
GFR NON-AFRICAN AMERICAN: 53 ML/MIN/1.73
GLUCOSE BLD-MCNC: 105 MG/DL (ref 74–99)
HIV INTERPRETATION: NEGATIVE
HIV-1 ANTIBODY: NEGATIVE
HIV-2 AB: NEGATIVE
MAGNESIUM: 1.9 MG/DL (ref 1.6–2.6)
POTASSIUM REFLEX MAGNESIUM: 3.5 MMOL/L (ref 3.5–5)
PRO-BNP: ABNORMAL PG/ML (ref 0–450)
SODIUM BLD-SCNC: 146 MMOL/L (ref 132–146)

## 2021-02-11 PROCEDURE — 94640 AIRWAY INHALATION TREATMENT: CPT

## 2021-02-11 PROCEDURE — 80048 BASIC METABOLIC PNL TOTAL CA: CPT

## 2021-02-11 PROCEDURE — 83880 ASSAY OF NATRIURETIC PEPTIDE: CPT

## 2021-02-11 PROCEDURE — 6370000000 HC RX 637 (ALT 250 FOR IP): Performed by: INTERNAL MEDICINE

## 2021-02-11 PROCEDURE — G0378 HOSPITAL OBSERVATION PER HR: HCPCS

## 2021-02-11 PROCEDURE — 2580000003 HC RX 258: Performed by: INTERNAL MEDICINE

## 2021-02-11 PROCEDURE — 6370000000 HC RX 637 (ALT 250 FOR IP): Performed by: NURSE PRACTITIONER

## 2021-02-11 PROCEDURE — APPSS30 APP SPLIT SHARED TIME 16-30 MINUTES: Performed by: NURSE PRACTITIONER

## 2021-02-11 PROCEDURE — 83735 ASSAY OF MAGNESIUM: CPT

## 2021-02-11 PROCEDURE — 96376 TX/PRO/DX INJ SAME DRUG ADON: CPT

## 2021-02-11 PROCEDURE — 36415 COLL VENOUS BLD VENIPUNCTURE: CPT

## 2021-02-11 PROCEDURE — 96372 THER/PROPH/DIAG INJ SC/IM: CPT

## 2021-02-11 PROCEDURE — 6360000002 HC RX W HCPCS: Performed by: INTERNAL MEDICINE

## 2021-02-11 PROCEDURE — 6360000002 HC RX W HCPCS: Performed by: NURSE PRACTITIONER

## 2021-02-11 RX ORDER — SPIRONOLACTONE 25 MG/1
12.5 TABLET ORAL DAILY
Qty: 30 TABLET | Refills: 3 | Status: SHIPPED | OUTPATIENT
Start: 2021-02-12 | End: 2021-03-09 | Stop reason: SINTOL

## 2021-02-11 RX ORDER — FUROSEMIDE 20 MG/1
20 TABLET ORAL 2 TIMES DAILY
Qty: 60 TABLET | Refills: 3 | Status: SHIPPED | OUTPATIENT
Start: 2021-02-11 | End: 2021-02-18 | Stop reason: SDUPTHER

## 2021-02-11 RX ORDER — GAUZE BANDAGE 2" X 2"
100 BANDAGE TOPICAL DAILY
Qty: 30 TABLET | Refills: 0 | Status: SHIPPED | OUTPATIENT
Start: 2021-02-11 | End: 2021-05-10

## 2021-02-11 RX ORDER — ATORVASTATIN CALCIUM 20 MG/1
20 TABLET, FILM COATED ORAL NIGHTLY
Qty: 30 TABLET | Refills: 3 | Status: SHIPPED | OUTPATIENT
Start: 2021-02-11 | End: 2021-09-22 | Stop reason: ALTCHOICE

## 2021-02-11 RX ORDER — MAGNESIUM OXIDE 400 MG/1
400 TABLET ORAL 2 TIMES DAILY
Qty: 30 TABLET | Refills: 1 | Status: SHIPPED | OUTPATIENT
Start: 2021-02-11 | End: 2021-05-10

## 2021-02-11 RX ORDER — POTASSIUM CHLORIDE 20 MEQ/1
40 TABLET, EXTENDED RELEASE ORAL ONCE
Status: COMPLETED | OUTPATIENT
Start: 2021-02-11 | End: 2021-02-11

## 2021-02-11 RX ORDER — METOPROLOL SUCCINATE 25 MG/1
25 TABLET, EXTENDED RELEASE ORAL DAILY
Qty: 30 TABLET | Refills: 3 | Status: SHIPPED | OUTPATIENT
Start: 2021-02-11 | End: 2021-03-08

## 2021-02-11 RX ADMIN — ASPIRIN 81 MG: 81 TABLET, CHEWABLE ORAL at 09:04

## 2021-02-11 RX ADMIN — ALLOPURINOL 100 MG: 100 TABLET ORAL at 09:05

## 2021-02-11 RX ADMIN — METOPROLOL SUCCINATE 25 MG: 25 TABLET, EXTENDED RELEASE ORAL at 09:04

## 2021-02-11 RX ADMIN — IPRATROPIUM BROMIDE AND ALBUTEROL SULFATE 3 ML: .5; 3 SOLUTION RESPIRATORY (INHALATION) at 09:34

## 2021-02-11 RX ADMIN — PANTOPRAZOLE SODIUM 40 MG: 40 TABLET, DELAYED RELEASE ORAL at 05:22

## 2021-02-11 RX ADMIN — Medication 10 ML: at 09:06

## 2021-02-11 RX ADMIN — SACUBITRIL AND VALSARTAN 1 TABLET: 24; 26 TABLET, FILM COATED ORAL at 09:04

## 2021-02-11 RX ADMIN — FUROSEMIDE 40 MG: 10 INJECTION, SOLUTION INTRAVENOUS at 09:04

## 2021-02-11 RX ADMIN — MAGNESIUM GLUCONATE 500 MG ORAL TABLET 400 MG: 500 TABLET ORAL at 09:05

## 2021-02-11 RX ADMIN — ENOXAPARIN SODIUM 40 MG: 40 INJECTION SUBCUTANEOUS at 09:05

## 2021-02-11 RX ADMIN — COLCHICINE 0.6 MG: 0.6 TABLET ORAL at 09:05

## 2021-02-11 RX ADMIN — POTASSIUM CHLORIDE 40 MEQ: 20 TABLET, EXTENDED RELEASE ORAL at 14:49

## 2021-02-11 RX ADMIN — SPIRONOLACTONE 12.5 MG: 25 TABLET ORAL at 09:05

## 2021-02-11 ASSESSMENT — PAIN SCALES - GENERAL
PAINLEVEL_OUTOF10: 0
PAINLEVEL_OUTOF10: 2
PAINLEVEL_OUTOF10: 3

## 2021-02-11 NOTE — PROGRESS NOTES
Discharge instructions and medications reviewed with patient and all questions answered. He is going to be transported home by his significant other. He is to take his LifeVest home with him and the cardiologist office is going to be sending an event monitor to his home. Patient verbalized understanding.

## 2021-02-11 NOTE — PROGRESS NOTES
Subjective:  Feeling better   No CP or SOB  No fever or chills   No uncontrolled pain  No vomiting or diarrhea     Objective:    /66   Pulse 68   Temp 98 °F (36.7 °C) (Temporal)   Resp 16   Ht 5' 7\" (1.702 m)   Wt 159 lb 6.4 oz (72.3 kg)   SpO2 93%   BMI 24.97 kg/m²     24HR INTAKE/OUTPUT:      Intake/Output Summary (Last 24 hours) at 2/11/2021 1116  Last data filed at 2/11/2021 0519  Gross per 24 hour   Intake 660 ml   Output 1375 ml   Net -715 ml       General appearance: NAD, conversant  Neck: FROM, supple   Lungs: Clear bilaterally no wheezes, no rhonchi, no crackles  CV: RRR, no MRGs; normal carotid upstroke and amplitude without Bruits  Abdomen: Soft, non-tender; no masses or HSM  Extremities: No edema, no cyanosis, no clubbing  Skin: Intact no rash, no lesions, no ulcers    Psych: Alert and oriented normal affect  Neuro: Nonfocal  Most Recent Labs  Lab Results   Component Value Date    WBC 6.7 02/10/2021    HGB 15.3 02/10/2021    HCT 46.8 02/10/2021     02/10/2021     02/11/2021    K 3.5 02/11/2021     (H) 02/11/2021    CREATININE 1.3 (H) 02/11/2021    BUN 25 (H) 02/11/2021    CO2 24 02/11/2021    GLUCOSE 105 (H) 02/11/2021    ALT 18 02/08/2021    AST 14 02/08/2021    INR 1.3 02/08/2021    TSH 1.760 02/06/2021    LABA1C 5.7 (H) 02/06/2021     Recent Labs     02/11/21  0516   MG 1.9     Lab Results   Component Value Date    CALCIUM 8.5 (L) 02/11/2021    PHOS 3.0 02/09/2021        XR CHEST PORTABLE   Final Result   Partial improvement of signs of volume overload since February 8 study. Assessment    Active Problems:    Pleural effusion    CAD in native artery    Acute on chronic systolic (congestive) heart failure (HCC)    HIV antibody positive (HCC)    Nonischemic cardiomyopathy (HCC)  Resolved Problems:    * No resolved hospital problems. *      Plan:  72-year-old male history of gout alcohol abuse presents with CHF.   Patient is transferred from [facility] for left heart catheterization which revealed nonobstructive CAD     GDMT  EF 15 to 20%  LifeVest  IV Lasix transition to p.o. for DC  Weaned off oxygen 93 to 100% on room air  Cardiology consult appreciated  ID consult-- follow up +HIV screen  Medications for other co morbidities cont as appropriate w dosage adjustments as necessary   Monitor for withdrawal, thiamine/MVI  Alcohol cessation/moderation counseled  Advised attendance of peer support meetings such as AA and/or counseling if cessation proves difficult  PT/OT  DVT PPx  DC planning DC home       Electronically signed by Elpidio Cooley MD on 2/11/2021 at 11:16 AM

## 2021-02-11 NOTE — PROGRESS NOTES
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 On transfer, he was afebrile and hemodynamically stable with no leukocytosis. He underwent cardiac catheterization on 02/09 during which moderate single-vessel CAD was noted. HIV-1/HIV-2 Ab screen sent on 02/06 was positive. He reports no history of injection drug use. He has been sexually active with his current female partner for years. His partner states that she has only been sexually active with patient as well. Subjective: Patient was seen and examined. No chills, no abdominal pain, no diarrhea, no rash, no itching. Objective:  /66   Pulse 68   Temp 98 °F (36.7 °C) (Temporal)   Resp 16   Ht 5' 7\" (1.702 m)   Wt 159 lb 6.4 oz (72.3 kg)   SpO2 93%   BMI 24.97 kg/m²   Constitutional: Alert, not in distress  Respiratory: Clear breath sounds, no crackles, no wheezes  Cardiovascular: Regular rate and rhythm, no murmurs  Gastrointestinal: Bowel sounds present, soft, nontender  Skin: Warm and dry, no active dermatoses  Musculoskeletal: No joint swelling, no joint erythema    Labs, imaging, and medical records/notes were personally reviewed. Assessment:  HIV-antibody positive    Recommendations: Follow up HIV quantitative PCR. Follow up with me as outpatient. Thank you for involving me in the care of Kellie Gibbs. I will sign off for now. Please do not hesitate to call for any questions, concerns, or new findings.     Electronically signed by Jyoti Sinha MD on 2/11/2021 at 10:43 AM

## 2021-02-11 NOTE — DISCHARGE SUMMARY
Physician Discharge Summary     Patient ID:  Rubio Galarza  [id number]  18 y.o.  1940    Admit date: 2/9/2021    Discharge date and time:  2/11/2021    Discharge Diagnoses: Active Problems:    Pleural effusion    CAD in native artery    Acute on chronic systolic (congestive) heart failure (HCC)    HIV antibody positive (HCC)    Nonischemic cardiomyopathy (HCC)  Resolved Problems:    * No resolved hospital problems. *      Consults: IP CONSULT TO INFECTIOUS DISEASES    Procedures: See below    Hospital Course: 79-year-old male history of gout alcohol abuse presents with CHF w EF 20%.   Patient is transferred from [facility] for left heart catheterization which revealed nonobstructive CAD     GDMT  EF 15 to 20%  LifeVest  IV Lasix transition to p.o. for DC  Weaned off oxygen 93 to 100% on room air  Cardiology consult appreciated  ID consult-- follow up +HIV screen  Medications for other co morbidities cont as appropriate w dosage adjustments as necessary   Monitor for withdrawal, thiamine/MVI  Alcohol cessation/moderation counseled  Advised attendance of peer support meetings such as AA and/or counseling if cessation proves difficult  PT/OT  DVT PPx  DC planning DC home      Discharge Exam:  See progress note from today    Condition:  Stable    Disposition: home    Patient Instructions:   Current Discharge Medication List      START taking these medications    Details   furosemide (LASIX) 20 MG tablet Take 1 tablet by mouth 2 times daily  Qty: 60 tablet, Refills: 3      spironolactone (ALDACTONE) 25 MG tablet Take 0.5 tablets by mouth daily  Qty: 30 tablet, Refills: 3      atorvastatin (LIPITOR) 20 MG tablet Take 1 tablet by mouth nightly  Qty: 30 tablet, Refills: 3         CONTINUE these medications which have CHANGED    Details   thiamine mononitrate 100 MG tablet Take 1 tablet by mouth daily  Qty: 30 tablet, Refills: 0      magnesium oxide (MAG-OX) 400 MG tablet Take 1 tablet by mouth 2 times daily  Qty: 30 tablet, Refills: 1      metoprolol succinate (TOPROL XL) 25 MG extended release tablet Take 1 tablet by mouth daily  Qty: 30 tablet, Refills: 3      sacubitril-valsartan (ENTRESTO) 24-26 MG per tablet Take 1 tablet by mouth 2 times daily  Qty: 60 tablet, Refills: 3         CONTINUE these medications which have NOT CHANGED    Details   aspirin 81 MG chewable tablet Take 1 tablet by mouth daily  Qty: 30 tablet, Refills: 3      colchicine (COLCRYS) 0.6 MG tablet Take 1 tablet by mouth 2 times daily  Qty: 30 tablet, Refills: 0      allopurinol (ZYLOPRIM) 100 MG tablet Take 100 mg by mouth daily         STOP taking these medications       LORazepam (ATIVAN) 0.5 MG tablet Comments:   Reason for Stopping:         ipratropium-albuterol (DUONEB) 0.5-2.5 (3) MG/3ML SOLN nebulizer solution Comments:   Reason for Stopping:         enoxaparin (LOVENOX) 30 MG/0.3ML injection Comments:   Reason for Stopping:         furosemide (LASIX) 10 MG/ML injection Comments:   Reason for Stopping:         folic acid (FOLVITE) 1 MG tablet Comments:   Reason for Stopping:         polyethylene glycol (GLYCOLAX) 17 g packet Comments:   Reason for Stopping:         pantoprazole (PROTONIX) 40 MG tablet Comments:   Reason for Stopping:             Activity: activity as tolerated  Diet: cardiac diet    Follow-up with 1wk PCP, 2 wks cardiology    Note that over 30 minutes was spent in preparing discharge papers, discussing discharge with patient, staff, consultants, medication review, arranging follow up, etc.    Signed:  Rafa Sam MD  2/11/2021  3:00 PM

## 2021-02-11 NOTE — PROGRESS NOTES
DEJUAN Horowitz [address]    Name: Wendy Gómez    Age: 68 y.o. Date of Service: 2/11/2021    Reason for Consultation: Dilated cardiomyopathy, acute systolic CHF    Referring Physician: Rebeca Campbell MD    History of Present Illness:  Wendy Gómez is a 68 y.o. male (new to [facility] -- Dr. Isabelle Gómez) who presented to [facility] on 2/5/2021 for further evaluation of progressive SOB for the past 1 month. His PMH is outlined in detail below (see A/P below). He is a sub-optimal historian. He denies recent chest pain, palpitations, syncope. +orthopnea prior to admission. Respiratory status improved s/p thoracentesis (right sided 1500 cc on 2/5/2021) and s/p left thoracentesis (800 cc on 2/8/2021) but still with AREVALO. He is currently with no acute distress at rest. No new overnight cardiac complaints. SR on EKG and telemetry. He is a dentist.     Interval History:  · SCr 1.2 -->Patient was transferred to [facility] (2/9/2021) for C showing moderate single vessel CAD, nonischemic CMP, Elevated LVEDP at 31 mmHg. · Patient admitted (2/9/2021) for medical management. · Brief run of AF vs PSVT (2.1 seconds)    Review of Systems:   Cardiac: As per HPI  General: No fever, chills  Pulmonary: As per HPI  HEENT: No visual disturbances, difficult swallowing  GI: No nausea, vomiting  : No dysuria, hematuria  Endocrine: No thyroid disease or DM  Musculoskeletal: HALE x 4, no focal motor deficits  Skin: Intact, no rashes  Neuro: No headache, seizures  Psych: Currently with no depression, anxiety    Past Medical History:  Past Medical History:   Diagnosis Date    Acute on chronic systolic (congestive) heart failure ([address] 2/9/2021    CAD in native artery 2/9/2021    Gout     Torn rotator cuff 2008?     left       Past Surgical History:  Past Surgical History:   Procedure Laterality Date    CARDIAC CATHETERIZATION  02/09/2021    Dr. Uma Moffett Right 11/02/2016    COLONOSCOPY  2001 neg    FRACTURE SURGERY Left 1958    thumb    TONSILLECTOMY      UPPER GASTROINTESTINAL ENDOSCOPY  2001    small hiatal hernia    UPPER GASTROINTESTINAL ENDOSCOPY N/A 4/17/2019    EGD DILATION SAVORY performed by Radha Lee MD at [address] [place] [address]  4/17/2019    EGD BIOPSY performed by Radha Lee MD at [address] [place] [address]  4/17/2019    EGD POLYP SNARE performed by Radha Lee MD at [facility] ENDOSCOPY     Family History:  Non-contributory    Social History:  Social History     Socioeconomic History    Marital status:      Spouse name: Not on file    Number of children: Not on file    Years of education: Not on file    Highest education level: Not on file   Occupational History    Not on file   Social Needs    Financial resource strain: Not on file    Food insecurity     Worry: Not on file     Inability: Not on file    Transportation needs     Medical: Not on file     Non-medical: Not on file   Tobacco Use    Smoking status: Never Smoker    Smokeless tobacco: Never Used    Tobacco comment: smoked cigars 20 years ago on occasion   Substance and Sexual Activity    Alcohol use:  Yes     Alcohol/week: 4.0 standard drinks     Types: 4 Glasses of wine per week     Comment: Daily wine    Drug use: No    Sexual activity: Not on file   Lifestyle    Physical activity     Days per week: Not on file     Minutes per session: Not on file    Stress: Not on file   Relationships    Social connections     Talks on phone: Not on file     Gets together: Not on file     Attends Yazdanism service: Not on file     Active member of club or organization: Not on file     Attends meetings of clubs or organizations: Not on file     Relationship status: Not on file    Intimate partner violence     Fear of current or ex partner: Not on file     Emotionally abused: Not on file     Physically abused: Not on file     Forced sexual activity: Not on file   Other Topics Concern    Not on file   Social History Narrative    Not on file       Allergies:  No Known Allergies    Home Medications:  Prior to Admission medications    Medication Sig Start Date End Date Taking?  Authorizing Provider   furosemide (LASIX) 20 MG tablet Take 1 tablet by mouth 2 times daily 2/11/21  Yes Meli Delgado MD   thiamine mononitrate 100 MG tablet Take 1 tablet by mouth daily 2/11/21  Yes Meli Delgado MD   spironolactone (ALDACTONE) 25 MG tablet Take 0.5 tablets by mouth daily 2/12/21  Yes Meli Delgado MD   atorvastatin (LIPITOR) 20 MG tablet Take 1 tablet by mouth nightly 2/11/21  Yes Meli Delgado MD   magnesium oxide (MAG-OX) 400 MG tablet Take 1 tablet by mouth 2 times daily 2/11/21  Yes Meli Delgado MD   metoprolol succinate (TOPROL XL) 25 MG extended release tablet Take 1 tablet by mouth daily 2/11/21  Yes Meli Delgado MD   sacubitril-valsartan [facility]) 24-26 MG per tablet Take 1 tablet by mouth 2 times daily 2/11/21  Yes Meli Delgado MD   aspirin 81 MG chewable tablet Take 1 tablet by mouth daily 2/10/21   JESUS Mancilla CNP   colchicine (COLCRYS) 0.6 MG tablet Take 1 tablet by mouth 2 times daily 2/9/21   JESUS Mancilla CNP   allopurinol (ZYLOPRIM) 100 MG tablet Take 100 mg by mouth daily    Historical Provider, MD       Current Medications:  Current Facility-Administered Medications   Medication Dose Route Frequency Provider Last Rate Last Admin    aspirin chewable tablet 81 mg  81 mg Oral Daily JESUS Mo CNP   81 mg at 02/11/21 0654    spironolactone (ALDACTONE) tablet 12.5 mg  12.5 mg Oral Daily JESUS Mo CNP   12.5 mg at 02/11/21 0905    0.9 % sodium chloride infusion   Intravenous Once Darby Siddiqui MD        0.9 % sodium chloride infusion   Intravenous Once Darby Siddiqui MD        sodium chloride flush 0.9 % injection 10 mL  10 mL Intravenous 2 times per day Bernice BLOOM Maria Ines Morales MD   10 mL at 02/11/21 0906    sodium chloride flush 0.9 % injection 10 mL  10 mL Intravenous PRN Deyvi Caballero MD   10 mL at 02/10/21 1820    acetaminophen (TYLENOL) tablet 650 mg  650 mg Oral Q4H PRN Deyvi Caballero MD        allopurinol (ZYLOPRIM) tablet 100 mg  100 mg Oral Daily Enrique Siegel MD   100 mg at 02/11/21 0905    colchicine (COLCRYS) tablet 0.6 mg  0.6 mg Oral BID Enrique Siegel MD   0.6 mg at 02/11/21 0905    ipratropium-albuterol (DUONEB) nebulizer solution 3 mL  3 mL Inhalation Q4H Enrique Siegel MD   3 mL at 02/11/21 0934    LORazepam (ATIVAN) tablet 0.5 mg  0.5 mg Oral Q4H PRN Enrique Siegel MD        magnesium oxide (MAG-OX) tablet 400 mg  400 mg Oral BID Enrique Siegel MD   400 mg at 02/11/21 0905    pantoprazole (PROTONIX) tablet 40 mg  40 mg Oral QAM AC Enrique Siegel MD   40 mg at 02/11/21 0522    sacubitril-valsartan (ENTRESTO) 24-26 MG per tablet 1 tablet  1 tablet Oral BID Enrique Siegel MD   1 tablet at 02/11/21 0904    metoprolol succinate (TOPROL XL) extended release tablet 25 mg  25 mg Oral Daily Enrique Siegel MD   25 mg at 02/11/21 0904    enoxaparin (LOVENOX) injection 40 mg  40 mg Subcutaneous Daily Enrique Siegel MD   40 mg at 02/11/21 0905    atorvastatin (LIPITOR) tablet 40 mg  40 mg Oral Nightly Enrique Siegel MD   40 mg at 02/10/21 2109      sodium chloride      sodium chloride         Physical Exam:  /66   Pulse 68   Temp 98 °F (36.7 °C) (Temporal)   Resp 16   Ht 5' 7\" (1.702 m)   Wt 159 lb 6.4 oz (72.3 kg)   SpO2 93%   BMI 24.97 kg/m²   Wt Readings from Last 3 Encounters:   02/11/21 159 lb 6.4 oz (72.3 kg)   02/08/21 156 lb (70.8 kg)   04/17/19 164 lb 4.8 oz (74.5 kg)     Appearance: Awake, no acute respiratory distress  Skin: Intact, no rash  Head: Normocephalic, atraumatic  Eyes: EOMI, no conjunctival erythema  ENMT: No pharyngeal erythema, MMM, no rhinorrhea  Neck: Supple, no carotid bruits  Lungs: Decreased BS B/L, no wheezing  Cardiac: Regular rate and rhythm, no murmurs apparent  Abdomen: Soft, nontender, +bowel sounds  Extremities: Moves all extremities x 4, no lower extremity edema  Neurologic: No focal motor deficits apparent, normal mood and affect    Intake/Output:    Intake/Output Summary (Last 24 hours) at 2/11/2021 1333  Last data filed at 2/11/2021 1215  Gross per 24 hour   Intake 600 ml   Output 1575 ml   Net -975 ml     I/O this shift:  In: -   Out: 200 [Urine:200]    Laboratory Tests:  Recent Labs     02/09/21  0633 02/10/21  0452 02/11/21  0516    144 146   K 3.9 3.8 3.5   * 107 109*   CO2 24 23 24   BUN 22 25* 25*   CREATININE 1.2 1.2 1.3*   GLUCOSE 103* 86 105*   CALCIUM 8.6 8.9 8.5*     Lab Results   Component Value Date    MG 1.9 02/11/2021     No results for input(s): ALKPHOS, ALT, AST, PROT, BILITOT, BILIDIR, LABALBU in the last 72 hours.   Recent Labs     02/09/21  0633 02/10/21  0452   WBC 8.2 6.7   RBC 4.40 4.78   HGB 14.4 15.3   HCT 43.9 46.8   MCV 99.8 97.9   MCH 32.7 32.0   MCHC 32.8 32.7   RDW 13.7 13.5    164   MPV 12.3* 11.9     Lab Results   Component Value Date    TROPONINI <0.01 02/05/2021     Lab Results   Component Value Date    INR 1.3 02/08/2021    PROTIME 14.4 (H) 02/08/2021     Lab Results   Component Value Date    TSH 1.760 02/06/2021     Lab Results   Component Value Date    LABA1C 5.7 (H) 02/06/2021     No results found for: EAG  Lab Results   Component Value Date    CHOL 160 02/06/2021    CHOL 208 (H) 11/21/2014    CHOL 233 (H) 11/21/2013     Lab Results   Component Value Date    TRIG 79 02/06/2021    TRIG 210 (H) 11/21/2014    TRIG 103 11/21/2013     Lab Results   Component Value Date    HDL 36 02/06/2021    HDL 57 11/21/2014    HDL 68 11/21/2013     Lab Results   Component Value Date    LDLCALC 108 (H) 02/06/2021    LDLCALC 109 (H) 11/21/2014    LDLCALC 144 (H) 11/21/2013     Lab Results   Component Value Date    LABVLDL 16 02/06/2021    LABVLDL 42 11/21/2014     No results found for: CHOLHDLRATIO  No results for input(s): PROBNP in the last 72 hours. Cardiac Tests:  EKG reviewed (EKG date: 2/5/2021): SR, rate 78, LAD, PRWP    Telemetry reviewed (date: 2/11/2021): SR, rate 70's-80's    CTA chest: 2/5/2021  No evidence of pulmonary embolism.  Respiratory motion limits evaluation for   small pulmonary emboli. Moderate pulmonary and interstitial edema. Moderate to large bilateral pleural effusions with compressive atelectasis. CXR reviewed: 2/5/2021  Interval reduction in volume of right pleural fluid following thoracentesis. No convincing evidence of a pneumothorax. Persistent moderate size left pleural effusion with left basilar airspace   opacification, likely atelectasis. Cardiac catheterization: 2/9/2021    LEFT MAIN:  It is a short and large artery which was almost absent. LAD:  It is a large artery wrapping around the apex and giving rise to  three diagonal branches and several septal perforators. The first and  third diagonal branches were fair in size and the second diagonal was  small. The LAD was calcified in its proximal to mid segment. There was  40% to 50% discrete mid LAD stenosis. LEFT CIRCUMFLEX:  It is a large codominant artery giving rise to a very  small first obtuse marginal branch which is tortuous. It also gives  rise to a fair size second obtuse marginal branch which is tortuous. It  also gives rise to a very small third obtuse marginal branch, then to a  bifurcating PLV branch. No angiographic stenosis noted in the  circumflex or its branches. RCA:  It is a large codominant artery giving rise to a conus branch,  three right ventricular marginal branches, and a PDA. No angiographic  stenosis noted. IMPRESSION:  1. Moderate single-vessel CAD. 2.  Elevated LVEDP at 31 mmHg. 3.  Nonischemic cardiomyopathy.       TTE: 2/7/2021   Moderately dilated left ventricle. Mild left ventricular concentric hypertrophy noted.    Ejection fraction is visually estimated at 15-20%. Severely depressed ejection fraction. The left atrium is mild-moderately dilated. Interatrial septum appears intact. Moderately dilated right ventricle. Decreased RV function   Mildly enlarged right atrium size. Mild to moderate mitral regurgitation is present. No evidence of mitral valve stenosis. Mild tricuspid regurgitation. RVSP is 46.62 mmHg. Pulmonary hypertension is mild . Regular rhythm. Dilated cardiomyopathy       ASSESSMENT / PLAN:  1. Acute HFrEF: LVEF 15-20%. Clinically appears euvolemic. 2. Nonischemic cardiomyopathy: ? Alcohol induced. 3. Mod single vessel CAD on Cardiac cath (2/9/2021)  4. Aortic regurgitation / Mild-Mod MR, Mild TR  5. Bilateral pleural effusions s/p right sided thoracentesis on 2/5/2021 (1500 cc) s/p left thoracentesis (800 cc) on 2/8/2021  6. History of increased ETOH intake: ? Confusion due to alcohol withdrawal  7. Borderline hypotension   8. HLD: on statin therapy   9. Mild renal insufficiency -- most recent Cr 1.2 --> 1.3 --> 1.2-->1.3 (today)  10. Elevated uric acid level -- on allopurinol  11. Hypokalemia: replaced   12. HIV-Antibody positive (newly diagnosed). 13. Brief run of AF vs PSVT    - Change Lasix to 20 mg QD  - Toprol XL and entresto started this admission -- unable to titrate due to soft BPs  - Consider adding Aldactone in outpatient if BP tolerates   - Continue ASA and Lipitor  - Cardiac catheterization results discussed with patient / Velora Ear at the bedside   - ETOH cessation / Consider AA for further counseling.   - Lifevest upon discharge (at bedside). - Outpatient 30 day event monitor to assess AF burden. - Follow-up with Dr. Nain Whitten and Lasha London (2/18/2021 at 11:30 AM)  upon discharge. Above assessment and plan as per Dr. Faibán Chairez.   Electronically signed by JESUS Ahumada - CNP on 2/10/21 at 9:53 AM EST

## 2021-02-12 ENCOUNTER — TELEPHONE (OUTPATIENT)
Dept: CARDIOLOGY CLINIC | Age: 81
End: 2021-02-12

## 2021-02-12 NOTE — TELEPHONE ENCOUNTER
----- Message from JESUS Morelos - CNP sent at 2/11/2021  4:30 PM EST -----  Patient was new to Dr. Hyacinth Vincent this admission. He will be seeing Sue Arias next week and then needs to see Luis in 1 month. He had a burst of AF and Dr. Ángela Lew would like to order a 30 day event monitor. He is being discharged today. Thank you.   Donna Villatoro

## 2021-02-14 LAB
DIRECT EXAM: NORMAL
SPECIMEN: NORMAL

## 2021-02-15 DIAGNOSIS — I48.91 ATRIAL FIBRILLATION, UNSPECIFIED TYPE (HCC): Primary | ICD-10-CM

## 2021-02-15 NOTE — TELEPHONE ENCOUNTER
Ordered for monitor placed and will be applied when pt sees Cliff Dumas.  Follow up based from there

## 2021-02-15 NOTE — TELEPHONE ENCOUNTER
Spoke with patient and his fiance' (Elizabeth Sandoval). Per Dr. Alison Lan note from 2/8/21, patient wishes to follow in the [place] office. Note forwarded to [facility] with Dr. Alison Lan office.

## 2021-02-18 ENCOUNTER — OFFICE VISIT (OUTPATIENT)
Dept: CARDIOLOGY CLINIC | Age: 81
End: 2021-02-18
Payer: MEDICARE

## 2021-02-18 ENCOUNTER — TELEPHONE (OUTPATIENT)
Dept: CARDIOLOGY CLINIC | Age: 81
End: 2021-02-18

## 2021-02-18 ENCOUNTER — NURSE ONLY (OUTPATIENT)
Dept: CARDIOLOGY CLINIC | Age: 81
End: 2021-02-18

## 2021-02-18 VITALS
WEIGHT: 149.6 LBS | HEIGHT: 66 IN | BODY MASS INDEX: 24.04 KG/M2 | DIASTOLIC BLOOD PRESSURE: 60 MMHG | HEART RATE: 61 BPM | SYSTOLIC BLOOD PRESSURE: 110 MMHG | OXYGEN SATURATION: 91 %

## 2021-02-18 DIAGNOSIS — I50.23 ACUTE ON CHRONIC SYSTOLIC (CONGESTIVE) HEART FAILURE (HCC): ICD-10-CM

## 2021-02-18 DIAGNOSIS — E61.1 IRON DEFICIENCY: ICD-10-CM

## 2021-02-18 DIAGNOSIS — I50.20 HFREF (HEART FAILURE WITH REDUCED EJECTION FRACTION) (HCC): Primary | ICD-10-CM

## 2021-02-18 DIAGNOSIS — I42.8 NONISCHEMIC CARDIOMYOPATHY (HCC): ICD-10-CM

## 2021-02-18 DIAGNOSIS — I25.10 CAD IN NATIVE ARTERY: ICD-10-CM

## 2021-02-18 PROCEDURE — 99215 OFFICE O/P EST HI 40 MIN: CPT | Performed by: NURSE PRACTITIONER

## 2021-02-18 PROCEDURE — 93000 ELECTROCARDIOGRAM COMPLETE: CPT | Performed by: INTERNAL MEDICINE

## 2021-02-18 RX ORDER — SACUBITRIL AND VALSARTAN 49; 51 MG/1; MG/1
1 TABLET, FILM COATED ORAL 2 TIMES DAILY
Qty: 60 TABLET | Refills: 3 | Status: SHIPPED
Start: 2021-02-18 | End: 2021-02-24 | Stop reason: DRUGHIGH

## 2021-02-18 RX ORDER — FUROSEMIDE 20 MG/1
20 TABLET ORAL DAILY
Qty: 60 TABLET | Refills: 3 | Status: SHIPPED
Start: 2021-02-18 | End: 2021-11-18

## 2021-02-18 SDOH — HEALTH STABILITY: MENTAL HEALTH: HOW OFTEN DO YOU HAVE A DRINK CONTAINING ALCOHOL?: NOT ASKED

## 2021-02-18 SDOH — HEALTH STABILITY: MENTAL HEALTH: HOW MANY STANDARD DRINKS CONTAINING ALCOHOL DO YOU HAVE ON A TYPICAL DAY?: NOT ASKED

## 2021-02-18 NOTE — PATIENT INSTRUCTIONS
1. Decrease lasix (furosemide) to 20 mg daily     2. Increase Entresto 49/51 mg twice daily  9 for now start taking 2 tablets of the 24/26 mg twice daily until you use up all this prescription - then the new prescription of 49/51 mg tablets will be at the pharmacy). 3. Get blood work in one week     4. Referral to cardiac rehab    5. Continue to wear LifeVest     6. Follow up in 1-2 months with Dr. Becky Romero in [place] office - sooner if needed    7. Continue to abstain from alcohol    8. Weigh yourself daily    -Stay Hydrated    -Diet should sodium restricted to 2 grams    -Again watch your daily weight trends and if you gain water weight please follow below instructions.    -If you gain 3-5 pounds in 2-3 days OR notice that you are retaining fluid in anyway just like you did before then take an extra dose of your water pill (furosemide/Lasix) every day until you lose the weight or feel better.    -If you notice that you have taken more than 2 extra doses in 1 week then please call and let us know. -If at any time you feel that you are retaining fluid, your medications are not working, or you feel ill in anyway, then please call us for either same day appointment or the next day, and for instructions. Our goal is to keep you out of the emergency room and the hospital and we have ways to do it. You just need to call us in a timely manner.     -If you become sick for other reasons, and notice that you are not urinating as much, the urine is very dark, you have significant diarrhea or vomiting, then please DO NOT take your water pill and CALL US immediately.

## 2021-02-18 NOTE — PROGRESS NOTES
[id number] [facility] Cardiology  Office Visit         Reason for Visit: Heart Failure    Primary Cardiologist: Dr. Ying Heredia > patient would like to follow in [place] office and is requesting to follow with Dr. Rojas Arita. History of Present Illness:     Mr. Nayan Mirza is a 68year old male with a PMHx of newly dx nonischemic cardiomyopathy. He presented to the emergency room with complaints of increased shortness of breath and orthopnea that has progressed for 1 month prior to arrival. CTA chest with no evidence of PE, moderate pulmonary edema and moderate-large bilateral pleural effusions. He was admitted for acute respiratory failure and acute heart failure. He was IV diuresed and underwent bilateral thoracentesis. TTE demonstrated LVEF 15-20%, LVEDD 6.2, LVMI 211. He was initiated on GDMT and LHC demonstrated moderate mild LAD stenosis. On telemetry prior to discharge there was possible burst of atrial fibrillation vs PSVT and he was ordered 30 day event monitor on discharge. He presents today in post acute heart failure hospitalization follow-up, since discharge from the hospital he has been compliant with all of his current cardiac medications. He reports excellent urinary response to oral Lasix with clear yellow urine production. He has been monitoring his diet for sodium content and is staying well-hydrated. He has been taking his second dose of Lasix at bedtime and urinating very frequently through the night. He has been compliant with his LifeVest and denies any alarms or discharges. He reportedly has not had any alcohol since discharge from the hospital.           He denies dyspnea with exertion, shortness of breath, or decline in overall functional capacity. He denies orthopnea, PND, nocturnal cough or hemoptysis. He denies abdominal distention or bloating, early satiety, anorexia/change in appetite, unintentional weight loss. He does not lower extremity edema.  He denies exertional lightheadedness. He denies palpitations, syncope or near syncope. Review of systems is negative for chest pain, pressure, discomfort. When ambulating on an incline, He does not leg claudication. History is negative for neurological symptoms including transient loss of vision, asymmetric weakness, aphasia, dysphasia, numbness, tingling. Patient Active Problem List    Diagnosis Date Noted    [facility] 02/10/2021    Nonischemic cardiomyopathy ([facility] 02/10/2021    CAD in native artery 02/09/2021    Acute on chronic systolic (congestive) heart failure ([facility] 02/09/2021    Pleural effusion 02/05/2021    Right cataract 11/02/2016       Past Medical History:   Diagnosis Date    Acute on chronic systolic (congestive) heart failure [facility] 2/9/2021    CAD in native artery 2/9/2021    Gout     Torn rotator cuff 2008?     left       Past Surgical History:   Procedure Laterality Date    CARDIAC CATHETERIZATION  02/09/2021    Dr. Baldo Soto Right 11/02/2016    COLONOSCOPY  2001    neg    FRACTURE SURGERY Left 1958    thumb    TONSILLECTOMY      UPPER GASTROINTESTINAL ENDOSCOPY  2001    small hiatal hernia    UPPER GASTROINTESTINAL ENDOSCOPY N/A 4/17/2019    EGD DILATION SAVORY performed by Mayela Montana MD at [address] [place] [address]  4/17/2019    EGD BIOPSY performed by Mayela Montana MD at [address] [place] [address]  4/17/2019    EGD POLYP SNARE performed by Mayela Montana MD at [place] [id number]         No Known Allergies      Outpatient Medications Marked as Taking for the 2/18/21 encounter (Office Visit) with JESUS Miller CNP   Medication Sig Dispense Refill    furosemide (LASIX) 20 MG tablet Take 1 tablet by mouth 2 times daily 60 tablet 3    thiamine mononitrate 100 MG tablet Take 1 tablet by mouth daily 30 tablet 0    spironolactone (ALDACTONE) 25 MG tablet Take 0.5 tablets by mouth daily 30 tablet 3    atorvastatin (LIPITOR) 20 MG tablet Take 1 tablet by mouth nightly 30 tablet 3    magnesium oxide (MAG-OX) 400 MG tablet Take 1 tablet by mouth 2 times daily 30 tablet 1    metoprolol succinate (TOPROL XL) 25 MG extended release tablet Take 1 tablet by mouth daily 30 tablet 3    sacubitril-valsartan (ENTRESTO) 24-26 MG per tablet Take 1 tablet by mouth 2 times daily 60 tablet 3    aspirin 81 MG chewable tablet Take 1 tablet by mouth daily 30 tablet 3    allopurinol (ZYLOPRIM) 100 MG tablet Take 100 mg by mouth daily           Guideline directed medical/device therapy:  ARNI/ACE I/ARB: Yes  Beta blocker: Yes  Aldosterone antagonist:  Yes  ICD/CRT-P/-D:  LifeVest   QRS interval on recent ECG (personally reviewed/interpreted): 120< but < 150 ms  Percentage RV pacing (personally reviewed/interpreted): %/NA            Review of Systems:   Cardiac: As per HPI  General: No fever, chills, rigors  Pulmonary: As per HPI  HEENT: No visual disturbances, difficult swallowing  GI: No nausea, vomiting, abdominal pain  : No dysuria or hematuria  Endocrine: No thyroid disease or diabetes  Musculoskeletal: HALE x 4, no focal motor deficits  Skin: Intact, no rashes  Neuro/Psych: No headache or seizures        Weights: Wt Readings from Last 3 Encounters:   02/18/21 149 lb 9.6 oz (67.9 kg)   02/11/21 159 lb 6.4 oz (72.3 kg)   02/08/21 156 lb (70.8 kg)         Physical Examination:     /60 (Site: Right Upper Arm, Position: Sitting, Cuff Size: Medium Adult)   Pulse 61   Ht 5' 6\" (1.676 m)   Wt 149 lb 9.6 oz (67.9 kg)   SpO2 91%   BMI 24.15 kg/m²     CONSTITUTIONAL: Alert and oriented times 3, no acute distress and cooperative to examination with proper mood and affect. SKIN: Skin color, texture, turgor normal. No rashes or lesions. LYMPH: no cervical nodes, no inguinal nodes  HEENT: Head is normocephalic, atraumatic. EOMI, PERRLA.   NECK: Supple, symmetrical, trachea midline, no adenopathy, thyroid symmetric, not enlarged and no tenderness, skin normal. No jvd/hjr. CHEST/LUNGS: chest symmetric with normal A/P diameter, normal respiratory rate and rhythm, lungs diminished bases, otherwise clear to auscultation without wheezes, rales or rhonchi. No accessory muscle use. Scars None   CARDIOVASCULAR: Heart sounds are normal.  Regular rate and rhythm without murmur, gallop or rub. Normal S1 and S2. . Carotid and femoral pulses 2+/4 and equal bilaterally. ABDOMEN: Normal shape. No and Laparoscopic scar(s) present. Normal bowel sounds. No bruits. soft, nondistended, no masses or organomegaly. no evidence of hernia. Percussion: Normal without hepatosplenomegally. Tenderness: absent. RECTAL: deferred, not clinically indicated  NEUROLOGIC: There are no focalizing motor or sensory deficits. CN II-XII are grossly intact. Tatiana Dues EXTREMITIES: no cyanosis, no clubbing and no edema. Warm and well perfused. All the following diagnostics were personally reviewed and interpreted by me. LAB DATA:     2/10/2021 04:52 2/11/2021 05:16   Sodium 144 146   Potassium 3.8 3.5   Chloride 107 109 (H)   CO2 23 24   BUN 25 (H) 25 (H)   Creatinine 1.2 1.3 (H)   Anion Gap 14 13   GFR Non- 59 53   GFR African American >60 >60   Magnesium  1.9   Glucose 86 105 (H)   Calcium 8.9 8.5 (L)   Uric Acid, Serum 10.9 (H)    WBC 6.7    RBC 4.78    Hemoglobin Quant 15.3    Hematocrit 46.8    MCV 97.9    MCH 32.0    MCHC 32.7    MPV 11.9    RDW 13.5    Platelet Count 889       2/5/2021 12:20 2/11/2021 12:12   Pro-BNP 18,507 (H) 17,725 (H)       IMAGING:    CXR (2/10/2021)  FINDINGS:  There is diminished signs of perihilar vascular congestion/volume overload. The residual changes still present at the bases.  The there is no conspicuous pleural effusions.  The heart is enlarged. Impression:  Partial improvement of signs of volume overload since February 8 study.       CARDIAC TESTING:    [facility] (2/9/2021)  FINDINGS:  HEMODYNAMICS:  Aortic pressure 125/73 mmHg. Left ventricular end-diastolic pressure 31 mmHg. CORONARY ANATOMY:  LEFT MAIN:  It is a short and large artery which was almost absent. LAD:  It is a large artery wrapping around the apex and giving rise to three diagonal branches and several septal perforators. The first and third diagonal branches were fair in size and the second diagonal was small. The LAD was calcified in its proximal to mid segment. There was 40% to 50% discrete mid LAD stenosis. LEFT CIRCUMFLEX:  It is a large codominant artery giving rise to a very small first obtuse marginal branch which is tortuous. It also gives rise to a fair size second obtuse marginal branch which is tortuous. It also gives rise to a very small third obtuse marginal branch, then to a bifurcating PLV branch. No angiographic stenosis noted in the circumflex or its branches. RCA:  It is a large codominant artery giving rise to a conus branch, three right ventricular marginal branches, and a PDA. No angiographic stenosis noted. IMPRESSION:  1. Moderate single-vessel CAD. 2.  Elevated LVEDP at 31 mmHg. 3.  Nonischemic cardiomyopathy. RECOMMENDATIONS:  1. Aggressive medical therapy. 2.  Guideline-directed medical therapy. TTE (2/7/2021)   Summary: Moderately dilated left ventricle. Mild left ventricular concentric hypertrophy noted. Ejection fraction is visually estimated at 15-20%. Severely depressed ejection fraction. The left atrium is mild-moderately dilated. Interatrial septum appears intact. Moderately dilated right ventricle. Decreased RV function   Mildly enlarged right atrium size. Mild to moderate mitral regurgitation is present. No evidence of mitral valve stenosis. Mild tricuspid regurgitation. RVSP is 46.62 mmHg. Pulmonary hypertension is mild . Regular rhythm.    Dilated cardiomyopathy    EKG  Sinus Rhythm   Intraventricular conduction defect and left axis   Anterior infarct      ASSESSMENT:  1. Nonischemic cardiomyopathy, possible ETOH vs HTN  2. LVEF 15-20%, LVEDD 6.2, LVMI 211  3. Chronic HFrEF, ACC stage C / NYHA class III  4. Euvolemic   5. Moderate single vessel (mid LAD) dx per [facility] (2/9/2021)   6. Mild-moderate MR  7. Moderate RV dysfunction with mild TR  8. Brief run of AF vs PSVT - applied event monitor today in office for further evaluation  9. CKD  10. HLD - on statin therapy  11. Hx of bilateral pleural effusions s/p thoracentesis (2/2021)  12. HIV-Antibody positive - following with ID   13. ETOH abuse - reportedly abstained since discharge from hospital       PLAN:  1. Decrease lasix (furosemide) to 20 mg daily - however you can use an extra dose in the afternoon if needed. 2. Increase Entresto 49/51 mg twice daily (for now start taking 2 tablets of the 24/26 mg twice daily until you use up all this prescription - then the new prescription of 49/51 mg tablets will be at the pharmacy). 3. Get blood work in one week     4. Referral to cardiac rehab    5. Continue to wear LifeVest     6. Follow up in 1-2 months with Dr. Yunier Orozco in [place] office - sooner if needed    7. Continue to abstain from alcohol    8. Weigh yourself daily    -Stay Hydrated    -Diet should sodium restricted to 2 grams    -Again watch your daily weight trends and if you gain water weight please follow below instructions.    -If you gain 3-5 pounds in 2-3 days OR notice that you are retaining fluid in anyway just like you did before then take an extra dose of your water pill (furosemide/Lasix) every day until you lose the weight or feel better.    -If you notice that you have taken more than 2 extra doses in 1 week then please call and let us know. -If at any time you feel that you are retaining fluid, your medications are not working, or you feel ill in anyway, then please call us for either same day appointment or the next day, and for instructions.  Our goal is to keep you out of the emergency room and the hospital and we have ways to do it. You just need to call us in a timely manner.     -If you become sick for other reasons, and notice that you are not urinating as much, the urine is very dark, you have significant diarrhea or vomiting, then please DO NOT take your water pill and CALL US immediately. > 40 minutes was spent reviewing chart and more than 50 % of that time was spent face to face with patient and significant other educating on heart failure, prognosis, treatment, medications and diet.        Magda LEE-CNP  [facility] Cardiology

## 2021-02-18 NOTE — TELEPHONE ENCOUNTER
Was in to see you today. 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

## 2021-02-22 ENCOUNTER — TELEPHONE (OUTPATIENT)
Dept: CARDIOLOGY CLINIC | Age: 81
End: 2021-02-22

## 2021-02-22 NOTE — TELEPHONE ENCOUNTER
Savita Owen called and would like to know when pt can return to work? 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  She can be reached at [phone]

## 2021-02-23 ENCOUNTER — HOSPITAL ENCOUNTER (OUTPATIENT)
Age: 81
Discharge: HOME | End: 2021-02-23
Payer: MEDICARE

## 2021-02-23 LAB
ANION GAP SERPL CALCULATED.3IONS-SCNC: 8 MMOL/L (ref 7–16)
BUN BLDV-MCNC: 16 MG/DL (ref 8–23)
CALCIUM SERPL-MCNC: 9.1 MG/DL (ref 8.6–10.2)
CHLORIDE BLD-SCNC: 103 MMOL/L (ref 98–107)
CO2: 29 MMOL/L (ref 22–29)
CREAT SERPL-MCNC: 1 MG/DL (ref 0.7–1.2)
GFR AFRICAN AMERICAN: >60
GFR NON-AFRICAN AMERICAN: >60 ML/MIN/1.73
GLUCOSE BLD-MCNC: 116 MG/DL (ref 74–99)
IRON SATURATION: 41 % (ref 20–55)
IRON: 97 MCG/DL (ref 59–158)
POTASSIUM SERPL-SCNC: 4.2 MMOL/L (ref 3.5–5)
PRO-BNP: 2897 PG/ML (ref 0–450)
SODIUM BLD-SCNC: 140 MMOL/L (ref 132–146)
TOTAL IRON BINDING CAPACITY: 239 MCG/DL (ref 250–450)

## 2021-02-23 PROCEDURE — 83880 ASSAY OF NATRIURETIC PEPTIDE: CPT

## 2021-02-23 PROCEDURE — 80048 BASIC METABOLIC PNL TOTAL CA: CPT

## 2021-02-23 PROCEDURE — 36415 COLL VENOUS BLD VENIPUNCTURE: CPT

## 2021-02-23 PROCEDURE — 83550 IRON BINDING TEST: CPT

## 2021-02-23 PROCEDURE — 83540 ASSAY OF IRON: CPT

## 2021-02-23 NOTE — TELEPHONE ENCOUNTER
Reached out to jennifer morales. 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 As long as patient is not near tools that have high voltage he should be able to return to work with lifevest in place.  Jennifer morales did not feel that there would be any tools in the dental office that would interfere with the continued wearing of lifevest.

## 2021-02-23 NOTE — RESULT ENCOUNTER NOTE
Labs reviewed, also see recent note about returning to work with lifevest.   Down trending pro-bnp   Current GDMT  Moderate dose Entresto BID  Aldactone 25 mg daily  Toprol 25 mg daily ( pulse during last office appointment 61 - limiting titration)  Lasix 20 mg daily     If feeling ok, increase Entresto to high dose  Follow up labs in 1 week    Thank you.

## 2021-02-24 ENCOUNTER — TELEPHONE (OUTPATIENT)
Dept: CARDIOLOGY CLINIC | Age: 81
End: 2021-02-24

## 2021-02-24 DIAGNOSIS — I50.20 HFREF (HEART FAILURE WITH REDUCED EJECTION FRACTION) (HCC): Primary | ICD-10-CM

## 2021-02-24 RX ORDER — SACUBITRIL AND VALSARTAN 97; 103 MG/1; MG/1
1 TABLET, FILM COATED ORAL 2 TIMES DAILY
Qty: 60 TABLET | Refills: 11 | Status: SHIPPED
Start: 2021-02-24 | End: 2022-02-14

## 2021-02-24 NOTE — PROGRESS NOTES
bloating/distention, decreased appetite, diaphoresis, dizziness, palpitations, near syncope or syncope. He denies paroxysmal nocturnal dyspnea or orthopnea. He denies subjective fever, chills, cough or any known recent sick contacts. He states he is still urinating frequently with his decrease in Lasix dose, and has not had to take any extra furosemide doses. Wt Readings from Last 3 Encounters:   02/18/21 149 lb 9.6 oz (67.9 kg)   02/11/21 159 lb 6.4 oz (72.3 kg)   02/08/21 156 lb (70.8 kg)         Please note: past medical records were reviewed per electronic medical record (EMR) - see detailed reports under Past Medical/ Surgical History. PAST MEDICAL HISTORY:    1. Chronic HFrEF. 2. Non-ischemic cardiomyopathy, possibly alcohol induced. 3. Non-obstructive coronary artery disease by left heart cath 2/2021.  4. Alcohol abuse. 5. Hyperlipidemia, on statin therapy. 6. Gout. 7. Valvular heart disease. 8. Chronic kidney disease. 9. HIV-Antibody positive - following with ID. Has been retested -- initial test was false positive per ID. 10. Bilateral pleural effusions s/p right sided thoracentesis on 2/5/2021 (1500 cc) s/p left thoracentesis (800 cc) on 2/8/2021. CARDIAC TESTING    Cardiac catheterization (2/9/2021)  LEFT MAIN: Aida Dellen is a short and large artery which was almost absent. LAD:  It is a large artery wrapping around the apex and giving rise to  three diagonal branches and several septal perforators.  The first and  third diagonal branches were fair in size and the second diagonal was  small.  The LAD was calcified in its proximal to mid segment.  There was  40% to 50% discrete mid LAD stenosis.   LEFT CIRCUMFLEX:  It is a large codominant artery giving rise to a very  small first obtuse marginal branch which is tortuous.  It also gives  rise to a fair size second obtuse marginal branch which is tortuous.  It  also gives rise to a very small third obtuse marginal branch, then to a bifurcating PLV branch.  No angiographic stenosis noted in the  circumflex or its branches. RCA:  It is a large codominant artery giving rise to a conus branch,  three right ventricular marginal branches, and a PDA.  No angiographic  stenosis noted. IMPRESSION:  1.  Moderate single-vessel CAD. 2.  Elevated LVEDP at 31 mmHg. 3.  Nonischemic cardiomyopathy.     TTE (2/7/2021)   Moderately dilated left ventricle.   Mild left ventricular concentric hypertrophy noted.   Ejection fraction is visually estimated at 15-20%.   Severely depressed ejection fraction.   The left atrium is mild-moderately dilated.   Interatrial septum appears intact.   Moderately dilated right ventricle.   Decreased RV function   Mildly enlarged right atrium size.   Mild to moderate mitral regurgitation is present.   No evidence of mitral valve stenosis.   Mild tricuspid regurgitation.  RVSP is 46.62 mmHg.   Pulmonary hypertension is mild .   Regular rhythm.   Dilated cardiomyopathy        PAST SURGICAL HISTORY:    Past Surgical History:   Procedure Laterality Date    CARDIAC CATHETERIZATION  02/09/2021    Dr. Demetris Diaz Right 11/02/2016    COLONOSCOPY  2001    neg    FRACTURE SURGERY Left 1958    thumb    TONSILLECTOMY      UPPER GASTROINTESTINAL ENDOSCOPY  2001    small hiatal hernia    UPPER GASTROINTESTINAL ENDOSCOPY N/A 4/17/2019    EGD DILATION SAVORY performed by Patience Damon MD at [address] [place] [address]  4/17/2019    EGD BIOPSY performed by Patience Damon MD at [address] [place] [address]  4/17/2019    EGD POLYP SNARE performed by Patience Damno MD at [address]:  Prior to Admission medications    Medication Sig Start Date End Date Taking?  Authorizing Provider   furosemide (LASIX) 20 MG tablet Take 1 tablet by mouth daily 2/18/21   JESUS Lombardo - CNP   sacubitril-valsartan (ENTRESTO) 49-51 MG per tablet Take 1 tablet by mouth 2 times daily 2/18/21   Elke Vinson APRN - CNP   thiamine mononitrate 100 MG tablet Take 1 tablet by mouth daily 2/11/21   Keshia Mooney MD   spironolactone (ALDACTONE) 25 MG tablet Take 0.5 tablets by mouth daily 2/12/21   Keshia Mooney MD   atorvastatin (LIPITOR) 20 MG tablet Take 1 tablet by mouth nightly 2/11/21   Keshia Mooney MD   magnesium oxide (MAG-OX) 400 MG tablet Take 1 tablet by mouth 2 times daily 2/11/21   Keshia Mooney MD   metoprolol succinate (TOPROL XL) 25 MG extended release tablet Take 1 tablet by mouth daily 2/11/21   Keshia Mooney MD   aspirin 81 MG chewable tablet Take 1 tablet by mouth daily 2/10/21   JESUS Payne CNP   colchicine (COLCRYS) 0.6 MG tablet Take 1 tablet by mouth 2 times daily  Patient not taking: Reported on 2/18/2021 2/9/21   JESUS Payne CNP   allopurinol (ZYLOPRIM) 100 MG tablet Take 100 mg by mouth daily    Historical Provider, MD       CURRENT MEDICATIONS:      Current Outpatient Medications:     sacubitril-valsartan (ENTRESTO)  MG per tablet, Take 1 tablet by mouth 2 times daily, Disp: 60 tablet, Rfl: 11    furosemide (LASIX) 20 MG tablet, Take 1 tablet by mouth daily, Disp: 60 tablet, Rfl: 3    thiamine mononitrate 100 MG tablet, Take 1 tablet by mouth daily, Disp: 30 tablet, Rfl: 0    spironolactone (ALDACTONE) 25 MG tablet, Take 0.5 tablets by mouth daily, Disp: 30 tablet, Rfl: 3    atorvastatin (LIPITOR) 20 MG tablet, Take 1 tablet by mouth nightly, Disp: 30 tablet, Rfl: 3    magnesium oxide (MAG-OX) 400 MG tablet, Take 1 tablet by mouth 2 times daily, Disp: 30 tablet, Rfl: 1    metoprolol succinate (TOPROL XL) 25 MG extended release tablet, Take 1 tablet by mouth daily, Disp: 30 tablet, Rfl: 3    aspirin 81 MG chewable tablet, Take 1 tablet by mouth daily, Disp: 30 tablet, Rfl: 3    allopurinol (ZYLOPRIM) 100 MG tablet, Take 100 mg by mouth daily, Disp: , Rfl:       ALLERGIES: Patient has no known allergies. SOCIAL HISTORY:    Social History     Socioeconomic History    Marital status:      Spouse name: Not on file    Number of children: Not on file    Years of education: Not on file    Highest education level: Not on file   Occupational History    Not on file   Social Needs    Financial resource strain: Not on file    Food insecurity     Worry: Not on file     Inability: Not on file    Transportation needs     Medical: Not on file     Non-medical: Not on file   Tobacco Use    Smoking status: Never Smoker    Smokeless tobacco: Never Used    Tobacco comment: smoked cigars 40 years ago on occasion   Substance and Sexual Activity    Alcohol use: Not Currently     Alcohol/week: 4.0 standard drinks     Types: 4 Glasses of wine per week     Comment: Daily wine-pt has not had since hosp    Drug use: No    Sexual activity: Not on file   Lifestyle    Physical activity     Days per week: Not on file     Minutes per session: Not on file    Stress: Not on file   Relationships    Social connections     Talks on phone: Not on file     Gets together: Not on file     Attends Jew service: Not on file     Active member of club or organization: Not on file     Attends meetings of clubs or organizations: Not on file     Relationship status: Not on file    Intimate partner violence     Fear of current or ex partner: Not on file     Emotionally abused: Not on file     Physically abused: Not on file     Forced sexual activity: Not on file   Other Topics Concern    Not on file   Social History Narrative    1-2 cups coffee daily; 2-3 cans 7-up daily       FAMILY HISTORY:   Family History   Problem Relation Age of Onset    Cancer Mother     Cancer Father          REVIEW OF SYSTEMS:     · Constitutional: +weight gain. Denies fevers, chills, night sweats, and generalized fatigue. · HEENT: Denies headaches, nose bleeds, rhinorrhea, sore throat. Denies blurred vision.  Denies commands. Pleasant affect. DATA:    EKG 3/4/2021 (interpreted by me):  NSR, HR 67 bpm  Non-specific ST-T wave changes        Labs:   BMP:   Recent Labs     02/23/21  1248      K 4.2   CO2 29   BUN 16   CREATININE 1.0   LABGLOM >60   CALCIUM 9.1     HgA1c:   Lab Results   Component Value Date    LABA1C 5.7 (H) 02/06/2021     FASTING LIPID PANEL:  Lab Results   Component Value Date    CHOL 160 02/06/2021    HDL 36 02/06/2021    LDLCALC 108 02/06/2021    TRIG 79 02/06/2021         ASSESSMENT:  1. Nonischemic cardiomyopathy, possible ETOH vs HTN. LVEF 15-20%, LVEDD 6.2, LVMI 211  2. Chronic HFrEF, ACC stage C / NYHA class III. Euvolemic. 3. Moderate single vessel (mid LAD) dx per [facility] (2/9/2021). Stable. 4. Mild-moderate MR.  5. Moderate RV dysfunction with mild TR. 6. Brief run of AF vs PSVT - applied event monitor today in office for further evaluation. 7. Chronic kidney disease. 8. HLD - on statin therapy. 9. Hx of bilateral pleural effusions s/p thoracentesis (2/2021). 10. HIV-Antibody positive, retested and negative (initial was false + per ID). 11. ETOH abuse - reportedly abstained since discharge from hospital.      PLAN:   1. Patient declined CHF and cardiac rehab referrals at this time. 2. I discussed possibly increasing his Lasix to 20 mg BID for a few days due to weight gain (no other signs of volume overload on physical exam). Patient and his significant other deem his weight gain to scale discrepancy and increased food intake. Patient wishes to stay on current Lasix dose, and he fully understands if he has further weight gain or other signs of volume overload (orthopnea, dyspnea, edema) to take an extra dose and to call our office. All of this was discussed in detail as noted below. 3. Patient is going to get repeat lab work after his OV today to assess response to increased Entresto dose. 4. HR re-checked and in the low-mid 60s -- still limiting BB titration.    5. Will continue current medical therapy the same at this time (high dose Entresto, BB, Aldactone and Lasix). 6. Continue with LifeVest.   7. Will call patient regarding Event Monitor results once completed. 8. Follow-up in one month with Dr. Shirley Mallory (they wish to follow in [place]). Obtain repeat BMP, BNP prior to that visit. HF education/Instructions:     -Stay Hydrated     -Diet should sodium restricted to 2 grams     -Again watch your daily weight trends and if you gain water weight please follow below instructions.     -If you gain 3-5 pounds in 2-3 days OR notice that you are retaining fluid in anyway just like you did before then take an extra dose of your water pill (furosemide/Lasix) every day until you lose the weight or feel better.     -If you notice that you have taken more than 2 extra doses in 1 week then please call and let us know.     -If at any time you feel that you are retaining fluid, your medications are not working, or you feel ill in anyway, then please call us for either same day appointment or the next day, and for instructions. Our goal is to keep you out of the emergency room and the hospital and we have ways to do it. You just need to call us in a timely manner.      -If you become sick for other reasons, and notice that you are not urinating as much, the urine is very dark, you have significant diarrhea or vomiting, then please DO NOT take your water pill and CALL US immediately. The patient's current medication list, allergies, problem list, recent labs and diagnostic testing were reviewed at today's visit.       Pablo Pablo, [address] [place] [address], [facility]    Electronically signed by Ashia Renee PA-C on 3/4/2021 at 12:54 PM

## 2021-02-24 NOTE — TELEPHONE ENCOUNTER
PA obtained for 49-51mg BID entresto 11 25 2020 to 2 24 2022. [facility] 1940 [id number] member ID [id number]      I called   [facility] with Barbara Guerrero D 528pm  No reference for call log available. Per Barbara Guerrero no PA is needed for entersto 97 103mg   60 for 30. Once entresto has a PA it covers all strengths. So  Entresto 97-103mg is covered with auth # [id number]  Till 2 24 22. Transferred to [address] [place] [address] in 1 [facility] exceptions for entresto 97 - 103mg   60 for 30. Is a Tier 3. Tier exception is  going to pharmacist for further review. Ref # : [id number]   Turn around time for processing is 72 hrs.

## 2021-02-24 NOTE — TELEPHONE ENCOUNTER
----- Message from JESUS Ndiaye CNP sent at 2/23/2021  3:34 PM EST -----  Labs reviewed, also see recent note about returning to work with lifevest.   Down trending pro-bnp   Current GDMT  Moderate dose Entresto BID  Aldactone 25 mg daily  Toprol 25 mg daily ( pulse during last office appointment 61 - limiting titration)  Lasix 20 mg daily     If feeling ok, increase Entresto to high dose  Follow up labs in 1 week    Thank you.

## 2021-03-04 ENCOUNTER — OFFICE VISIT (OUTPATIENT)
Dept: CARDIOLOGY CLINIC | Age: 81
End: 2021-03-04
Payer: MEDICARE

## 2021-03-04 ENCOUNTER — HOSPITAL ENCOUNTER (OUTPATIENT)
Age: 81
Discharge: HOME | End: 2021-03-04
Payer: MEDICARE

## 2021-03-04 VITALS
WEIGHT: 155 LBS | HEART RATE: 67 BPM | DIASTOLIC BLOOD PRESSURE: 52 MMHG | HEIGHT: 66 IN | SYSTOLIC BLOOD PRESSURE: 120 MMHG | BODY MASS INDEX: 24.91 KG/M2

## 2021-03-04 DIAGNOSIS — I50.20 HFREF (HEART FAILURE WITH REDUCED EJECTION FRACTION) (HCC): ICD-10-CM

## 2021-03-04 DIAGNOSIS — E61.1 IRON DEFICIENCY: ICD-10-CM

## 2021-03-04 DIAGNOSIS — I50.20 HFREF (HEART FAILURE WITH REDUCED EJECTION FRACTION) (HCC): Primary | ICD-10-CM

## 2021-03-04 LAB
ANION GAP SERPL CALCULATED.3IONS-SCNC: 9 MMOL/L (ref 7–16)
BUN BLDV-MCNC: 28 MG/DL (ref 8–23)
CALCIUM SERPL-MCNC: 9.1 MG/DL (ref 8.6–10.2)
CHLORIDE BLD-SCNC: 102 MMOL/L (ref 98–107)
CO2: 29 MMOL/L (ref 22–29)
CREAT SERPL-MCNC: 0.9 MG/DL (ref 0.7–1.2)
GFR AFRICAN AMERICAN: >60
GFR NON-AFRICAN AMERICAN: >60 ML/MIN/1.73
GLUCOSE BLD-MCNC: 82 MG/DL (ref 74–99)
IRON SATURATION: 66 % (ref 20–55)
IRON: 153 MCG/DL (ref 59–158)
POTASSIUM SERPL-SCNC: 5.2 MMOL/L (ref 3.5–5)
PRO-BNP: 5299 PG/ML (ref 0–450)
SODIUM BLD-SCNC: 140 MMOL/L (ref 132–146)
TOTAL IRON BINDING CAPACITY: 233 MCG/DL (ref 250–450)
TRANSFERRIN: 187 MG/DL (ref 200–360)

## 2021-03-04 PROCEDURE — 83540 ASSAY OF IRON: CPT

## 2021-03-04 PROCEDURE — 83550 IRON BINDING TEST: CPT

## 2021-03-04 PROCEDURE — 84466 ASSAY OF TRANSFERRIN: CPT

## 2021-03-04 PROCEDURE — 99213 OFFICE O/P EST LOW 20 MIN: CPT | Performed by: PHYSICIAN ASSISTANT

## 2021-03-04 PROCEDURE — 80048 BASIC METABOLIC PNL TOTAL CA: CPT

## 2021-03-04 PROCEDURE — 36415 COLL VENOUS BLD VENIPUNCTURE: CPT

## 2021-03-04 PROCEDURE — 93000 ELECTROCARDIOGRAM COMPLETE: CPT | Performed by: INTERNAL MEDICINE

## 2021-03-04 PROCEDURE — 83880 ASSAY OF NATRIURETIC PEPTIDE: CPT

## 2021-03-05 ENCOUNTER — TELEPHONE (OUTPATIENT)
Dept: CARDIOLOGY CLINIC | Age: 81
End: 2021-03-05

## 2021-03-08 ENCOUNTER — TELEPHONE (OUTPATIENT)
Dept: CARDIOLOGY CLINIC | Age: 81
End: 2021-03-08

## 2021-03-08 ENCOUNTER — TELEPHONE (OUTPATIENT)
Dept: CARDIOLOGY | Age: 81
End: 2021-03-08

## 2021-03-08 DIAGNOSIS — I50.20 HFREF (HEART FAILURE WITH REDUCED EJECTION FRACTION) (HCC): Primary | ICD-10-CM

## 2021-03-08 RX ORDER — METOPROLOL SUCCINATE 25 MG/1
25 TABLET, EXTENDED RELEASE ORAL 2 TIMES DAILY
Qty: 30 TABLET | Refills: 3 | Status: SHIPPED | OUTPATIENT
Start: 2021-03-08 | End: 2021-04-30 | Stop reason: SDUPTHER

## 2021-03-09 ENCOUNTER — HOSPITAL ENCOUNTER (OUTPATIENT)
Age: 81
Discharge: HOME | End: 2021-03-09
Payer: MEDICARE

## 2021-03-09 ENCOUNTER — TELEPHONE (OUTPATIENT)
Dept: CARDIOLOGY CLINIC | Age: 81
End: 2021-03-09

## 2021-03-09 DIAGNOSIS — I50.20 HFREF (HEART FAILURE WITH REDUCED EJECTION FRACTION) (HCC): Primary | ICD-10-CM

## 2021-03-09 DIAGNOSIS — I50.20 HFREF (HEART FAILURE WITH REDUCED EJECTION FRACTION) (HCC): ICD-10-CM

## 2021-03-09 LAB
ANION GAP SERPL CALCULATED.3IONS-SCNC: 10 MMOL/L (ref 7–16)
BUN BLDV-MCNC: 23 MG/DL (ref 8–23)
CALCIUM SERPL-MCNC: 9.1 MG/DL (ref 8.6–10.2)
CHLORIDE BLD-SCNC: 102 MMOL/L (ref 98–107)
CO2: 28 MMOL/L (ref 22–29)
CREAT SERPL-MCNC: 1 MG/DL (ref 0.7–1.2)
GFR AFRICAN AMERICAN: >60
GFR NON-AFRICAN AMERICAN: >60 ML/MIN/1.73
GLUCOSE BLD-MCNC: 90 MG/DL (ref 74–99)
HCT VFR BLD CALC: 47.2 % (ref 37–54)
HEMOGLOBIN: 15.3 G/DL (ref 12.5–16.5)
MAGNESIUM: 2.3 MG/DL (ref 1.6–2.6)
MCH RBC QN AUTO: 32.1 PG (ref 26–35)
MCHC RBC AUTO-ENTMCNC: 32.4 % (ref 32–34.5)
MCV RBC AUTO: 99 FL (ref 80–99.9)
PDW BLD-RTO: 13.2 FL (ref 11.5–15)
PLATELET # BLD: 237 E9/L (ref 130–450)
PMV BLD AUTO: 10.3 FL (ref 7–12)
POTASSIUM SERPL-SCNC: 5.4 MMOL/L (ref 3.5–5)
PRO-BNP: 2407 PG/ML (ref 0–450)
RBC # BLD: 4.77 E12/L (ref 3.8–5.8)
SODIUM BLD-SCNC: 140 MMOL/L (ref 132–146)
WBC # BLD: 7.6 E9/L (ref 4.5–11.5)

## 2021-03-09 PROCEDURE — 36415 COLL VENOUS BLD VENIPUNCTURE: CPT

## 2021-03-09 PROCEDURE — 83880 ASSAY OF NATRIURETIC PEPTIDE: CPT

## 2021-03-09 PROCEDURE — 83735 ASSAY OF MAGNESIUM: CPT

## 2021-03-09 PROCEDURE — 80048 BASIC METABOLIC PNL TOTAL CA: CPT

## 2021-03-09 PROCEDURE — 85027 COMPLETE CBC AUTOMATED: CPT

## 2021-03-09 NOTE — TELEPHONE ENCOUNTER
----- Message from Jatin Redman PA-C sent at 3/9/2021  3:19 PM EST -----  Hi Ivana    Can we call this marcellus and let him know to stop his Aldactone. .his potassium is still high. I will need him to re-check labs one week again due to his potassium.  Thanks

## 2021-03-16 ENCOUNTER — TELEPHONE (OUTPATIENT)
Dept: CARDIOLOGY CLINIC | Age: 81
End: 2021-03-16

## 2021-03-17 ENCOUNTER — HOSPITAL ENCOUNTER (OUTPATIENT)
Age: 81
Discharge: HOME | End: 2021-03-17
Payer: MEDICARE

## 2021-03-17 LAB
ANION GAP SERPL CALCULATED.3IONS-SCNC: 9 MMOL/L (ref 7–16)
BUN BLDV-MCNC: 20 MG/DL (ref 8–23)
CALCIUM SERPL-MCNC: 9.3 MG/DL (ref 8.6–10.2)
CHLORIDE BLD-SCNC: 104 MMOL/L (ref 98–107)
CO2: 30 MMOL/L (ref 22–29)
CREAT SERPL-MCNC: 0.9 MG/DL (ref 0.7–1.2)
GFR AFRICAN AMERICAN: >60
GFR NON-AFRICAN AMERICAN: >60 ML/MIN/1.73
GLUCOSE BLD-MCNC: 87 MG/DL (ref 74–99)
MAGNESIUM: 1.8 MG/DL (ref 1.6–2.6)
POTASSIUM SERPL-SCNC: 5 MMOL/L (ref 3.5–5)
SODIUM BLD-SCNC: 143 MMOL/L (ref 132–146)

## 2021-03-17 PROCEDURE — 83735 ASSAY OF MAGNESIUM: CPT

## 2021-03-17 PROCEDURE — 80048 BASIC METABOLIC PNL TOTAL CA: CPT

## 2021-03-17 PROCEDURE — 36415 COLL VENOUS BLD VENIPUNCTURE: CPT

## 2021-03-18 ENCOUNTER — TELEPHONE (OUTPATIENT)
Dept: CARDIOLOGY CLINIC | Age: 81
End: 2021-03-18

## 2021-03-18 NOTE — TELEPHONE ENCOUNTER
----- Message from Shawna Birmingham PA-C sent at 3/17/2021  3:52 PM EDT -----  Hi Ivana    His labs showed improvement in potassium level, so I would like him to continue off his Aldactone. Does he have a follow up with somebody in the next month or so ?   If so, he needs repeat labs prior to then    Thanks

## 2021-04-15 DIAGNOSIS — I48.91 ATRIAL FIBRILLATION, UNSPECIFIED TYPE (HCC): ICD-10-CM

## 2021-04-26 ENCOUNTER — HOSPITAL ENCOUNTER (OUTPATIENT)
Age: 81
Discharge: HOME | End: 2021-04-26
Payer: MEDICARE

## 2021-04-26 LAB
ALBUMIN SERPL-MCNC: 4 G/DL (ref 3.5–5.2)
ALP BLD-CCNC: 85 U/L (ref 40–129)
ALT SERPL-CCNC: 17 U/L (ref 0–40)
ANION GAP SERPL CALCULATED.3IONS-SCNC: 11 MMOL/L (ref 7–16)
AST SERPL-CCNC: 15 U/L (ref 0–39)
BILIRUB SERPL-MCNC: 0.5 MG/DL (ref 0–1.2)
BUN BLDV-MCNC: 24 MG/DL (ref 6–23)
CALCIUM SERPL-MCNC: 9.2 MG/DL (ref 8.6–10.2)
CHLORIDE BLD-SCNC: 107 MMOL/L (ref 98–107)
CHOLESTEROL, TOTAL: 177 MG/DL (ref 0–199)
CO2: 27 MMOL/L (ref 22–29)
CREAT SERPL-MCNC: 0.9 MG/DL (ref 0.7–1.2)
GFR AFRICAN AMERICAN: >60
GFR NON-AFRICAN AMERICAN: >60 ML/MIN/1.73
GLUCOSE BLD-MCNC: 106 MG/DL (ref 74–99)
HCT VFR BLD CALC: 34.7 % (ref 37–54)
HDLC SERPL-MCNC: 53 MG/DL
HEMOGLOBIN: 11.3 G/DL (ref 12.5–16.5)
LDL CHOLESTEROL CALCULATED: 106 MG/DL (ref 0–99)
MCH RBC QN AUTO: 31.7 PG (ref 26–35)
MCHC RBC AUTO-ENTMCNC: 32.6 % (ref 32–34.5)
MCV RBC AUTO: 97.2 FL (ref 80–99.9)
PDW BLD-RTO: 14 FL (ref 11.5–15)
PLATELET # BLD: 232 E9/L (ref 130–450)
PMV BLD AUTO: 9.8 FL (ref 7–12)
POTASSIUM SERPL-SCNC: 4.2 MMOL/L (ref 3.5–5)
RBC # BLD: 3.57 E12/L (ref 3.8–5.8)
SODIUM BLD-SCNC: 145 MMOL/L (ref 132–146)
TOTAL PROTEIN: 6.5 G/DL (ref 6.4–8.3)
TRIGL SERPL-MCNC: 89 MG/DL (ref 0–149)
URIC ACID, SERUM: 6.4 MG/DL (ref 3.4–7)
VLDLC SERPL CALC-MCNC: 18 MG/DL
WBC # BLD: 6.4 E9/L (ref 4.5–11.5)

## 2021-04-26 PROCEDURE — 36415 COLL VENOUS BLD VENIPUNCTURE: CPT

## 2021-04-26 PROCEDURE — 84550 ASSAY OF BLOOD/URIC ACID: CPT

## 2021-04-26 PROCEDURE — 80053 COMPREHEN METABOLIC PANEL: CPT

## 2021-04-26 PROCEDURE — 85027 COMPLETE CBC AUTOMATED: CPT

## 2021-04-26 PROCEDURE — 80061 LIPID PANEL: CPT

## 2021-04-30 RX ORDER — METOPROLOL SUCCINATE 25 MG/1
25 TABLET, EXTENDED RELEASE ORAL 2 TIMES DAILY
Qty: 180 TABLET | Refills: 3 | Status: SHIPPED | OUTPATIENT
Start: 2021-04-30

## 2021-05-05 ENCOUNTER — TELEPHONE (OUTPATIENT)
Dept: CARDIOLOGY CLINIC | Age: 81
End: 2021-05-05

## 2021-05-05 DIAGNOSIS — I50.20 HFREF (HEART FAILURE WITH REDUCED EJECTION FRACTION) (HCC): Primary | ICD-10-CM

## 2021-05-05 RX ORDER — SPIRONOLACTONE 25 MG/1
25 TABLET ORAL DAILY
Qty: 90 TABLET | Refills: 1 | Status: SHIPPED
Start: 2021-05-05 | End: 2021-05-06

## 2021-05-05 NOTE — TELEPHONE ENCOUNTER
Unable to cover Jardiance    Per Al Kinds CNS APRN   add Farxiga 10mg daily. Covered benefit with BCBS medicare. Sent [facility] prior auth forms via fax. Pending response. Closed  5/5/2021  1:23 PM  Case ID: changepayer Close reason: Prior Authorization not required for patient/medication   Note from payer: CoverMeds has predicted that this prior auth will not be required.  If you feel this is in error, please 'Change Payer' to continue with the request.   Payer:  [facility] Generic Payer    [phone]    Waiting for Payer Response  5/5/2021  1:23 PM       Ofelia Jaimes RN

## 2021-05-05 NOTE — TELEPHONE ENCOUNTER
----- Message from JESUS Vazquez sent at 4/29/2021  4:15 PM EDT -----  Regarding: RE: [address] and recent event monitor reviewed. Increase Aldactone to 25 mg daily  Add Jardiance 10 mg daily   Follow up BMP in one week     Pending heart rate at next office visit, will plan to titrate Toprol XL to 50 mg twice daily. Thank you!    ----- Message -----  From: Mark Dee RN  Sent: 4/28/2021   2:57 PM EDT  To: Taqueria Wall PA-C, #  Subject: RE: TITRATION PATIENT                            Had labs few days ago, Frederic Duckworth please advise if any med changes. Seeing Mallika Graves in May.  Wearing life vest still   ----- Message -----  From: JESUS Worthy CNP  Sent: 3/9/2021   2:47 PM EDT  To: JESUS Worthy CNP, #  Subject: TITRATION PATIENT                                Cardiologist: Dr. Mallika Graves    Nonischemic cardiomyopathy, possible ETOH  LVEF 15-20%  Wearing LifeVest     GDMT:  Entresto 97/103 mg BID  Toprol titrated to 25 mg BID ( 3/8/2021)  Aldactone 12.5 mg daily  Lasix 20 mg daily     Was seen by [facility] in office 3/4  Event monitor with atrial fibrillation - [facility] and toprol titrated 3/8/2021    Due for follow up labs this week   Anticipate aldactone titration and addition of SGLT2 inhibitor as able to introduce       ----- Message -----  From: JESUS Worthy CNP  Sent: 2/18/2021   1:14 PM EST  To: JESUS Worthy CNP, #  Subject: TITRATION PATIENT                                Cardiologist: Dr. Mallika Graves    Nonischemic cardiomyopathy, possible ETOH  LVEF 15-20%  Wearing LifeVest     GDMT:  Entresto 49/51 mg BID ( titrated in office on 2/18/2021)  Toprol 25 mg daily ( limited by resting HR, pulse 61 in office on 2/18/2021)  Aldactone 12.5 mg daily  Lasix 20 mg daily       Due for labs around 2/25/2021    Anticipate Entresto and aldactone titration   Referred to [facility] cardiac re[facility] (2/18/2021)

## 2021-05-05 NOTE — LETTER
[facility]  [address] [place] [address] Drive  Phone: [phone]  Fax: [phone]    JESUS Paulson        May 5, 2021    [address]      Dear Sean Yeboah:    Please increase aldactone (spironolactone ) 25mg daily. Also added Jardiance 10mg daily to your heart failure medication. Follow up lab (BMP in a week from the medication changes). If you have any questions or concerns, please don't hesitate to call.     Sincerely,        JESUS Paulson

## 2021-05-06 RX ORDER — SPIRONOLACTONE 25 MG/1
12.5 TABLET ORAL DAILY
Qty: 45 TABLET | Refills: 1 | Status: SHIPPED
Start: 2021-05-06 | End: 2021-11-16

## 2021-05-06 NOTE — TELEPHONE ENCOUNTER
$42 per month for Farxiga. Verified with pharmacy [facility].    Stopped spironolactone due to hyperkalemia (per Pushpa). Please advise if to continue to not take or if you want to re-initiate at 12.5mg daily (his prior dose)     Updated Pushpa on farxiga , she will initiate that for Jose Brown       I will call Pushpa back with advise on spironolactone    Dominique Alvarez please advise.

## 2021-05-06 NOTE — TELEPHONE ENCOUNTER
escribed new spironolactone script. Called [phone] (home)  spoke with Pushpa. I have reviewed the provider's instructions with the patient wife , answering all questions to her satisfaction. She picked up the 25mg daily script. She will give 1/2 tab daily to make 12.5mg daily spironolactone. Also picked up farxiga 10mg daily. Will do lab in 7-10 days from starting meds  F/u with DR Hu Desai as set on Monday.     Nacho Hood RN

## 2021-05-10 ENCOUNTER — OFFICE VISIT (OUTPATIENT)
Dept: CARDIOLOGY CLINIC | Age: 81
End: 2021-05-10
Payer: MEDICARE

## 2021-05-10 VITALS
HEART RATE: 50 BPM | DIASTOLIC BLOOD PRESSURE: 56 MMHG | BODY MASS INDEX: 27 KG/M2 | WEIGHT: 168 LBS | SYSTOLIC BLOOD PRESSURE: 110 MMHG | HEIGHT: 66 IN

## 2021-05-10 DIAGNOSIS — R00.1 SINUS BRADYCARDIA: ICD-10-CM

## 2021-05-10 DIAGNOSIS — I50.22 CHRONIC HFREF (HEART FAILURE WITH REDUCED EJECTION FRACTION) (HCC): ICD-10-CM

## 2021-05-10 DIAGNOSIS — I25.10 MILD CORONARY ARTERY DISEASE: ICD-10-CM

## 2021-05-10 DIAGNOSIS — I38 VHD (VALVULAR HEART DISEASE): ICD-10-CM

## 2021-05-10 DIAGNOSIS — I42.0 NONISCHEMIC DILATED CARDIOMYOPATHY (HCC): Primary | ICD-10-CM

## 2021-05-10 DIAGNOSIS — I48.0 PAROXYSMAL ATRIAL FIBRILLATION (HCC): ICD-10-CM

## 2021-05-10 PROCEDURE — 93000 ELECTROCARDIOGRAM COMPLETE: CPT | Performed by: INTERNAL MEDICINE

## 2021-05-10 PROCEDURE — 99214 OFFICE O/P EST MOD 30 MIN: CPT | Performed by: INTERNAL MEDICINE

## 2021-05-10 NOTE — PROGRESS NOTES
OFFICE VISIT     PRIMARY CARE PHYSICIAN:      Corrinne Flora, DO       ALLERGIES / SENSITIVITIES:      No Known Allergies       REVIEWED MEDICATIONS:        Current Outpatient Medications:     spironolactone (ALDACTONE) 25 MG tablet, Take 0.5 tablets by mouth daily, Disp: 45 tablet, Rfl: 1    dapagliflozin (FARXIGA) 10 MG tablet, Take 1 tablet by mouth every morning, Disp: 90 tablet, Rfl: 1    metoprolol succinate (TOPROL XL) 25 MG extended release tablet, Take 1 tablet by mouth 2 times daily, Disp: 180 tablet, Rfl: 3    apixaban (ELIQUIS) 5 MG TABS tablet, Take 1 tablet by mouth 2 times daily, Disp: 180 tablet, Rfl: 1    sacubitril-valsartan (ENTRESTO)  MG per tablet, Take 1 tablet by mouth 2 times daily, Disp: 60 tablet, Rfl: 11    furosemide (LASIX) 20 MG tablet, Take 1 tablet by mouth daily (Patient taking differently: Take 20 mg by mouth 2 times daily ), Disp: 60 tablet, Rfl: 3    atorvastatin (LIPITOR) 20 MG tablet, Take 1 tablet by mouth nightly, Disp: 30 tablet, Rfl: 3    allopurinol (ZYLOPRIM) 100 MG tablet, Take 100 mg by mouth daily, Disp: , Rfl:       S: REASON FOR VISIT:       Chief Complaint   Patient presents with    Hypertension     4 week check. Patient has the Life Vest on. Patient denies any cp sob or dizziness. History of Present Illness:       Office Visit for follow up of CMP, VHD, A Fib,Bradycardia    68 yr [facility], [facility] with history of CMP, PAF, CAD came for f/u visit   Seen by our CARL on 3/4/21 - wearing life vest   Plays rocket ball without SOB   No hospitalizations or surgeries since last visit   Patient is compliant with all medications   Terrell any exertional chest pain or short of breath   No palpitations, dizzy or syncope.    Active at home   No orthopnea   Try to watch diet          Past Medical History:   Diagnosis Date    Acute on chronic systolic (congestive) heart failure ([address] 2/9/2021    CAD in native artery 2/9/2021    Gout     Torn rotator cuff BP (!) 110/56   Pulse 50   Ht 5' 6\" (1.676 m)   Wt 168 lb (76.2 kg)   BMI 27.12 kg/m²       General:   Patient alert, comfortable, no distress. Appears stated age. HEENT:    Pupils equal, no icterus, no nasal drainage, tongue moist.   Neck:              No masses, Thyroid not palpable. No elevated JVD, No carotid bruit. Chest:   Normal configuration, non tender. Wearing Life vest   Lungs:   Clear to auscultation bilaterally, Left basal  rales,  few scattered rhonchi. Cardiovascular:  Regular rhythm, 2/6 systolic murmur, No S3, no palpable thrills,    Abdomen:  Soft, Non tender, Bowel sounds normal, no pulsatile abdominal aorta   Extremities:  + edema. Distal pulses palpable. No cyanosis   Skin:   Good turgor, warm and dry    Musculoskeletal: No joint swelling or deformity. Neuro:   Cranial nerves grossly intact; No focal neurologic deficit. Psych:   Alert, good mood and effect. REVIEW OF DIAGNOSTIC TESTS:        Electrocardiogram: NSR, sinus ady, IVCD       2D Echo 2/5/21    Moderately dilated left ventricle.   Mild left ventricular concentric hypertrophy noted.   Ejection fraction is visually estimated at 15-20%.   Severely depressed ejection fraction.   The left atrium is mild-moderately dilated.   Interatrial septum appears intact.   Moderately dilated right ventricle.   Decreased RV function   Mildly enlarged right atrium size.   Mild to moderate mitral regurgitation is present.   No evidence of mitral valve stenosis.   Mild tricuspid regurgitation.  RVSP is 46.62 mmHg.   Pulmonary hypertension is mild .   Regular rhythm.   Dilated cardiomyopathy       [facility] 2/9/2021;  IMPRESSION:  1. Moderate single-vessel CAD - LAD 40-50%  2. Elevated LVEDP at 31 mmHg. 3.  Nonischemic cardiomyopathy. A/P:   ASSESSMENT / PLAN:    Jackie Valerio was seen today for hypertension.     Diagnoses and all orders for this visit:    Nonischemic dilated cardiomyopathy ([facility] - EF 15-20% on 2/5/2021 - Last medication up-titration 2/18/20201 - On maximal tolerable medications, Continue BB, Entresto - Continue Life vest - Repeat Echo for LV EF, if <35% EP referral for ICD  - Echo limited for EF    Chronic HFrEF - Continue current medications  Limited Echo for LV Ef    Paroxysmal atrial fibrillation (HCC) - In Eliquis    Mild coronary artery disease - LAD 40-50% by Cath 2/9/21 - Continue BB, Statin. No ASA due to Eliquis    VHD (valvular heart disease) - Mild MR    Sinus bradycardia - Chronic, Asymptomatic - If symptomatic decrease BB dose. Hx of Alcohol use - he quit in Feb 2021    Other orders  -     EKG 12 Lead     Preventive Cardiology: Low cholesterol diet, regular exercise as tolerate, and gradual weight loss discussed. Monitor BP and heart rates. Above recommendations discussed with him and his fiancee. All questions answered about cardiac diagnoses and cardiac medications. Continue current medications. Compliance with medications and f/u with all physicians discussed. Risk factor modification based on risk profile discussed. Call if any exertional chest pain, short of breath, dizzy or palpitations   Follow up in 3 months or earlier if needed.    Call with Echo report      [facility]  [address], [place], [address] [facility]  [phone]

## 2021-05-14 ENCOUNTER — HOSPITAL ENCOUNTER (OUTPATIENT)
Age: 81
Discharge: HOME | End: 2021-05-14
Payer: MEDICARE

## 2021-05-14 DIAGNOSIS — I50.20 HFREF (HEART FAILURE WITH REDUCED EJECTION FRACTION) (HCC): ICD-10-CM

## 2021-05-14 LAB
ANION GAP SERPL CALCULATED.3IONS-SCNC: 9 MMOL/L (ref 7–16)
BUN BLDV-MCNC: 18 MG/DL (ref 6–23)
CALCIUM SERPL-MCNC: 9.6 MG/DL (ref 8.6–10.2)
CHLORIDE BLD-SCNC: 104 MMOL/L (ref 98–107)
CO2: 29 MMOL/L (ref 22–29)
CREAT SERPL-MCNC: 1.1 MG/DL (ref 0.7–1.2)
GFR AFRICAN AMERICAN: >60
GFR NON-AFRICAN AMERICAN: >60 ML/MIN/1.73
GLUCOSE BLD-MCNC: 95 MG/DL (ref 74–99)
POTASSIUM SERPL-SCNC: 4.6 MMOL/L (ref 3.5–5)
SODIUM BLD-SCNC: 142 MMOL/L (ref 132–146)

## 2021-05-14 PROCEDURE — 80048 BASIC METABOLIC PNL TOTAL CA: CPT

## 2021-05-14 PROCEDURE — 36415 COLL VENOUS BLD VENIPUNCTURE: CPT

## 2021-05-17 ENCOUNTER — TELEPHONE (OUTPATIENT)
Dept: CARDIOLOGY CLINIC | Age: 81
End: 2021-05-17

## 2021-05-17 NOTE — TELEPHONE ENCOUNTER
· Left VM for patient to call office update how he is feeling   Weights/ making good urine/ dizzy/ swelling/ SOB/ any vitals to report. What dose of lasix are you taking? Confirm cardiac meds he is on. Pending call back      5 10 saw DR Antonio Sargent    5/6/21   Med changes: spironolactone 12.5mg daily and farxiga 10mg daily added    GDMT:  Aldactone 12.5mg QD  Farxiga 10mg QD  Toprol xl 25mg BID  Entresto 97-103mg BID  Lasix 20mg twice daily   (verify dose, daily or twice daily? ) pending call back. LVEF 15-20%  Wearing lifevest       6-11 limited echo set.    Had event monitor,  AFIB        Results for Patrick Ramirez (MRN <[id number]>) as of 5/17/2021 15:07   3/4/2021 13:03 3/9/2021 13:40 3/17/2021 13:22 4/26/2021 11:01 5/14/2021 12:06   Sodium 140 140 143 145 142   Potassium 5.2 (H) 5.4 (H) 5.0 4.2 4.6   Chloride 102 102 104 107 104   CO2 29 28 30 (H) 27 29   BUN 28 (H) 23 20 24 (H) 18   Creatinine 0.9 1.0 0.9 0.9 1.1   Anion Gap 9 10 9 11 9   GFR Non-African American >60 >60 >60 >60 >60   GFR  >60 >60 >60 >60 >60   Magnesium  2.3 1.8     Glucose 82 90 87 106 (H) 95   Calcium 9.1 9.1 9.3 9.2 9.6   Total Protein    6.5    Uric Acid, Serum    6.4    Pro-BNP 5,299 (H) 2,407 (H)

## 2021-05-19 ENCOUNTER — TELEPHONE (OUTPATIENT)
Dept: CARDIOLOGY CLINIC | Age: 81
End: 2021-05-19

## 2021-05-19 ENCOUNTER — HOSPITAL ENCOUNTER (OUTPATIENT)
Age: 81
Discharge: HOME | End: 2021-05-19
Payer: MEDICARE

## 2021-05-19 DIAGNOSIS — I50.22 CHRONIC HFREF (HEART FAILURE WITH REDUCED EJECTION FRACTION) (HCC): ICD-10-CM

## 2021-05-19 DIAGNOSIS — I50.22 CHRONIC HFREF (HEART FAILURE WITH REDUCED EJECTION FRACTION) (HCC): Primary | ICD-10-CM

## 2021-05-19 LAB
ANION GAP SERPL CALCULATED.3IONS-SCNC: 11 MMOL/L (ref 7–16)
BUN BLDV-MCNC: 28 MG/DL (ref 6–23)
CALCIUM SERPL-MCNC: 9.2 MG/DL (ref 8.6–10.2)
CHLORIDE BLD-SCNC: 102 MMOL/L (ref 98–107)
CO2: 25 MMOL/L (ref 22–29)
CREAT SERPL-MCNC: 1.1 MG/DL (ref 0.7–1.2)
GFR AFRICAN AMERICAN: >60
GFR NON-AFRICAN AMERICAN: >60 ML/MIN/1.73
GLUCOSE BLD-MCNC: 109 MG/DL (ref 74–99)
POTASSIUM SERPL-SCNC: 4.5 MMOL/L (ref 3.5–5)
PRO-BNP: 684 PG/ML (ref 0–450)
SODIUM BLD-SCNC: 138 MMOL/L (ref 132–146)

## 2021-05-19 PROCEDURE — 83880 ASSAY OF NATRIURETIC PEPTIDE: CPT

## 2021-05-19 PROCEDURE — 80048 BASIC METABOLIC PNL TOTAL CA: CPT

## 2021-05-19 PROCEDURE — 36415 COLL VENOUS BLD VENIPUNCTURE: CPT

## 2021-05-19 NOTE — TELEPHONE ENCOUNTER
Patient of DR Roberth Acevedo  Last seen 5/10/21 in office  At that visit per  Notes had no dizziness. He Called c/o gout flair-up again. On allopurinol  300mg twice daily now (was 100mg daily prior to recurrent attack). Chief complaint:  intermittent dizziness    Phone assessment:  Feels like he is dizzy/lightheaded with positional change. And episodes can Lasts about 30 min  (noticed this since gout attacks stated)  Denies chest pain  Denies n/v  Denies Diaphoresis   Denies SOB  Wt gain noted. Was 150# now 160#  (says he is trying to gain weight)   Eating icecream to keep weight up every night. 158# today  (he feels it's flesh weight not fluid weight)  Edema: denies, clothes fit good. No abd size change, no leg edema. No dizziness at present. Says occurs  Intermittent. Always cold (chronic) .  No fever or chills  Urine color: clear color  Hydration: keeping hydrated per patient      2 18 to 3 19  event monitor              CHF:  HFrEF  NICM  2-7 15-20% EF  Wearing lifevest as directed  Repeat Echo set 6 11 2021    Diuretic:  Furosemide 20mg daily  Spironolactone 25mg take 1/2 tab daily    GDMT:  Aldactone 12.5mg daily  entresto 97-103mg BID  Metoprolol 25mg BID  farxiga 10mg daily    PLAN:  Currently no dizziness  Send note to cardiologist and CNS to input  Having BMP/BNP within the hour

## 2021-05-20 DIAGNOSIS — I50.22 CHRONIC HFREF (HEART FAILURE WITH REDUCED EJECTION FRACTION) (HCC): Primary | ICD-10-CM

## 2021-05-28 LAB
BUN BLDV-MCNC: NORMAL MG/DL
CALCIUM SERPL-MCNC: NORMAL MG/DL
CHLORIDE BLD-SCNC: NORMAL MMOL/L
CO2: NORMAL
CREAT SERPL-MCNC: NORMAL MG/DL
GFR CALCULATED: NORMAL
GLUCOSE BLD-MCNC: NORMAL MG/DL
POTASSIUM SERPL-SCNC: NORMAL MMOL/L
SODIUM BLD-SCNC: NORMAL MMOL/L
URIC ACID: NORMAL

## 2021-06-11 ENCOUNTER — HOSPITAL ENCOUNTER (OUTPATIENT)
Dept: CARDIOLOGY | Age: 81
Discharge: HOME | End: 2021-06-11
Payer: MEDICARE

## 2021-06-11 DIAGNOSIS — I42.0 NONISCHEMIC DILATED CARDIOMYOPATHY (HCC): ICD-10-CM

## 2021-06-11 DIAGNOSIS — I50.22 CHRONIC HFREF (HEART FAILURE WITH REDUCED EJECTION FRACTION) (HCC): ICD-10-CM

## 2021-06-11 PROCEDURE — 93308 TTE F-UP OR LMTD: CPT

## 2021-06-11 PROCEDURE — 6360000004 HC RX CONTRAST MEDICATION: Performed by: INTERNAL MEDICINE

## 2021-06-11 PROCEDURE — 2580000003 HC RX 258: Performed by: INTERNAL MEDICINE

## 2021-06-11 RX ORDER — SODIUM CHLORIDE 0.9 % (FLUSH) 0.9 %
5-40 SYRINGE (ML) INJECTION PRN
Status: DISCONTINUED | OUTPATIENT
Start: 2021-06-11 | End: 2021-06-12 | Stop reason: HOSPADM

## 2021-06-11 RX ADMIN — SODIUM CHLORIDE, PRESERVATIVE FREE 10 ML: 5 INJECTION INTRAVENOUS at 11:15

## 2021-06-11 RX ADMIN — SODIUM CHLORIDE, PRESERVATIVE FREE 10 ML: 5 INJECTION INTRAVENOUS at 11:17

## 2021-06-11 RX ADMIN — PERFLUTREN 1.1 MG: 6.52 INJECTION, SUSPENSION INTRAVENOUS at 11:15

## 2021-06-15 ENCOUNTER — TELEPHONE (OUTPATIENT)
Dept: CARDIOLOGY CLINIC | Age: 81
End: 2021-06-15

## 2021-06-15 NOTE — TELEPHONE ENCOUNTER
Dr. Moni Bunch,    I called and spoke with patient and daughter about his echo. He states that after taking his Metoprolol he sometimes feels \"woozy\". This has happened twice in the last 3 weeks. He states his BP is generally running 110/50s. Do you have any recommendations? Thank you.   Irma Burr

## 2021-08-30 RX ORDER — APIXABAN 5 MG/1
TABLET, FILM COATED ORAL
Qty: 180 TABLET | Refills: 1 | Status: SHIPPED
Start: 2021-08-30 | End: 2022-02-28

## 2021-09-21 ENCOUNTER — HOSPITAL ENCOUNTER (OUTPATIENT)
Age: 81
Discharge: HOME | End: 2021-09-21
Payer: MEDICARE

## 2021-09-21 ENCOUNTER — HOSPITAL ENCOUNTER (OUTPATIENT)
Dept: GENERAL RADIOLOGY | Age: 81
Discharge: HOME | End: 2021-09-23
Payer: MEDICARE

## 2021-09-21 ENCOUNTER — HOSPITAL ENCOUNTER (OUTPATIENT)
Age: 81
Discharge: HOME | End: 2021-09-23
Payer: MEDICARE

## 2021-09-21 DIAGNOSIS — I50.9 CONGESTIVE HEART FAILURE, UNSPECIFIED HF CHRONICITY, UNSPECIFIED HEART FAILURE TYPE (HCC): ICD-10-CM

## 2021-09-21 LAB
ALBUMIN SERPL-MCNC: 3.9 G/DL (ref 3.5–5.2)
ALP BLD-CCNC: 98 U/L (ref 40–129)
ALT SERPL-CCNC: 13 U/L (ref 0–40)
ANION GAP SERPL CALCULATED.3IONS-SCNC: 8 MMOL/L (ref 7–16)
AST SERPL-CCNC: 15 U/L (ref 0–39)
BILIRUB SERPL-MCNC: 0.3 MG/DL (ref 0–1.2)
BUN BLDV-MCNC: 18 MG/DL (ref 6–23)
CALCIUM SERPL-MCNC: 9.2 MG/DL (ref 8.6–10.2)
CHLORIDE BLD-SCNC: 107 MMOL/L (ref 98–107)
CHOLESTEROL, TOTAL: 181 MG/DL (ref 0–199)
CO2: 28 MMOL/L (ref 22–29)
CREAT SERPL-MCNC: 1.1 MG/DL (ref 0.7–1.2)
GFR AFRICAN AMERICAN: >60
GFR NON-AFRICAN AMERICAN: >60 ML/MIN/1.73
GLUCOSE BLD-MCNC: 108 MG/DL (ref 74–99)
HBA1C MFR BLD: 5.5 % (ref 4–5.6)
HDLC SERPL-MCNC: 34 MG/DL
LDL CHOLESTEROL CALCULATED: 123 MG/DL (ref 0–99)
POTASSIUM SERPL-SCNC: 4.4 MMOL/L (ref 3.5–5)
PRO-BNP: 507 PG/ML (ref 0–450)
SODIUM BLD-SCNC: 143 MMOL/L (ref 132–146)
TOTAL PROTEIN: 6.4 G/DL (ref 6.4–8.3)
TRIGL SERPL-MCNC: 119 MG/DL (ref 0–149)
TSH SERPL DL<=0.05 MIU/L-ACNC: 1.53 UIU/ML (ref 0.27–4.2)
URIC ACID, SERUM: 4.1 MG/DL (ref 3.4–7)
VLDLC SERPL CALC-MCNC: 24 MG/DL

## 2021-09-21 PROCEDURE — 83880 ASSAY OF NATRIURETIC PEPTIDE: CPT

## 2021-09-21 PROCEDURE — 84443 ASSAY THYROID STIM HORMONE: CPT

## 2021-09-21 PROCEDURE — 36415 COLL VENOUS BLD VENIPUNCTURE: CPT

## 2021-09-21 PROCEDURE — 80053 COMPREHEN METABOLIC PANEL: CPT

## 2021-09-21 PROCEDURE — 80061 LIPID PANEL: CPT

## 2021-09-21 PROCEDURE — 71046 X-RAY EXAM CHEST 2 VIEWS: CPT

## 2021-09-21 PROCEDURE — 84550 ASSAY OF BLOOD/URIC ACID: CPT

## 2021-09-21 PROCEDURE — 83036 HEMOGLOBIN GLYCOSYLATED A1C: CPT

## 2021-09-24 RX ORDER — PHENYLEPHRINE HYDROCHLORIDE 100 MG/ML
1 SOLUTION/ DROPS OPHTHALMIC PRN
Status: CANCELLED | OUTPATIENT
Start: 2021-09-24

## 2021-09-24 RX ORDER — PHENYLEPHRINE HCL 2.5 %
1 DROPS OPHTHALMIC (EYE)
Status: CANCELLED | OUTPATIENT
Start: 2021-09-24 | End: 2021-09-24

## 2021-09-24 RX ORDER — TETRACAINE HYDROCHLORIDE 5 MG/ML
1 SOLUTION OPHTHALMIC ONCE
Status: CANCELLED | OUTPATIENT
Start: 2021-09-24 | End: 2021-09-24

## 2021-09-24 RX ORDER — CYCLOPENTOLATE HYDROCHLORIDE 10 MG/ML
1 SOLUTION/ DROPS OPHTHALMIC
Status: CANCELLED | OUTPATIENT
Start: 2021-09-24 | End: 2021-09-24

## 2021-09-24 RX ORDER — FLURBIPROFEN SODIUM 0.3 MG/ML
1 SOLUTION/ DROPS OPHTHALMIC
Status: CANCELLED | OUTPATIENT
Start: 2021-09-24 | End: 2021-09-24

## 2021-09-24 NOTE — H&P
History and Physical    Patient's Name/Date of Birth: Roosevelt Flannery / 1940 (73 y.o.)    Date: September 24, 2021     Chief Complaint: Decreased vision of the left eye    HPI: Mature left cataract with decreased vision. Risks and complications as well as options and benefits were discussed with the patient and he has elected to proceed with left cataract extraction with intra ocular lens implant. Past Medical History:   Diagnosis Date    Acute on chronic systolic (congestive) heart failure ([address] 2/9/2021    CAD in native artery 2/9/2021    Gout     Hypertension     Torn rotator cuff 2008? left       Past Surgical History:   Procedure Laterality Date    CARDIAC CATHETERIZATION  02/09/2021    Dr. Diamante Calvert Right 11/02/2016    COLONOSCOPY  2001    neg    FRACTURE SURGERY Left 1958    thumb    TONSILLECTOMY      UPPER GASTROINTESTINAL ENDOSCOPY  2001    small hiatal hernia    UPPER GASTROINTESTINAL ENDOSCOPY N/A 4/17/2019    EGD DILATION SAVORY performed by Alonzo Sherman MD at [facility]  4/17/2019    EGD BIOPSY performed by Alonzo Sherman MD at [facility]  4/17/2019    EGD POLYP SNARE performed by Alonzo Sherman MD at [place] [address]       Prior to Admission medications    Medication Sig Start Date End Date Taking?  Authorizing Provider   ELIQUIS 5 MG TABS tablet TAKE 1 TABLET BY MOUTH 2 TIMES DAILY 8/30/21  Yes Reyes Sharp MD   spironolactone (ALDACTONE) 25 MG tablet Take 0.5 tablets by mouth daily  Patient taking differently: Take 25 mg by mouth daily  5/6/21  Yes JESUS Retana   dapagliflozin (FARXIGA) 10 MG tablet Take 1 tablet by mouth every morning 5/5/21  Yes JESUS Retana   metoprolol succinate (TOPROL XL) 25 MG extended release tablet Take 1 tablet by mouth 2 times daily  Patient taking differently: Take 25 mg by mouth 2 times daily AM 4/30/21  Yes Henry Bedolla MD   sacubitril-valsartan (ENTRESTO)  MG per tablet Take 1 tablet by mouth 2 times daily  Patient taking differently: Take 1 tablet by mouth 2 times daily AM 2/24/21  Yes JESUS Grace CNP   furosemide (LASIX) 20 MG tablet Take 1 tablet by mouth daily 2/18/21  Yes DoreenJESUS Gunter CNP   allopurinol (ZYLOPRIM) 100 MG tablet Take 100 mg by mouth daily 5/19/2021 taking 300mg allopurinol  Daily (flair up of gout)   Yes Historical Provider, MD       Patient has no known allergies. Family History   Problem Relation Age of Onset    Cancer Mother     Cancer Father        Social History     Socioeconomic History    Marital status:      Spouse name: Not on file    Number of children: Not on file    Years of education: Not on file    Highest education level: Not on file   Occupational History    Not on file   Tobacco Use    Smoking status: Never Smoker    Smokeless tobacco: Never Used    Tobacco comment: smoked cigars 40 years ago on occasion   Vaping Use    Vaping Use: Never used   Substance and Sexual Activity    Alcohol use: Not Currently    Drug use: No    Sexual activity: Not on file   Other Topics Concern    Not on file   Social History Narrative    1-2 cups coffee daily; 2-3 cans 7-up daily     Social Determinants of Health     Financial Resource Strain:     Difficulty of Paying Living Expenses:    Food Insecurity:     Worried About Running Out of Food in the Last Year:     Ran Out of Food in the Last Year:    Transportation Needs:     Lack of Transportation (Medical):      Lack of Transportation (Non-Medical):    Physical Activity:     Days of Exercise per Week:     Minutes of Exercise per Session:    Stress:     Feeling of Stress :    Social Connections:     Frequency of Communication with Friends and Family:     Frequency of Social Gatherings with Friends and Family:     Attends Alevism Services:     Active Member of [facility] or Organizations:     Attends Club or Organization Meetings:     Marital Status:    Intimate Partner Violence:     Fear of Current or Ex-Partner:     Emotionally Abused:     Physically Abused:     Sexually Abused:        Review of Systems:   CONSTITUTIONAL: Oriented yo person, place and time   EYES:  Mature left cataract with decreased vision effecting reading, driving and all routine home activities. Visual acuity is 20/70  HEENT:  negative  RESPIRATORY:  negative  CARDIOVASCULAR:  negative  GASTROINTESTINAL:  negative  GENITOURINARY:  negative  INTEGUMENT/BREAST:  negative  HEMATOLOGIC/LYMPHATIC:  negative  ALLERGIC/IMMUNOLOGIC:  negative  ENDOCRINE:  negative  MUSCULOSKELETAL:  negative  NEUROLOGICAL:  negative  BEHAVIOR/PSYCH:  negative    Physical Exam:  Vitals:    09/22/21 0916   Weight: 165 lb (74.8 kg)   Height: 5' 7\" (1.702 m)       CONSTITUTIONAL:  awake, alert, cooperative, no apparent distress, and appears stated age  EYES:  Lids and lashes normal, pupils equal, round and reactive to light, extra ocular muscles intact, sclera clear, conjunctiva normal  ENT:  Normocephalic, without obvious abnormality, atraumatic, sinuses nontender on palpation, external ears without lesions, oral pharynx with moist mucus membranes, tonsils without erythema or exudates, gums normal and good dentition.   NECK:  Supple, symmetrical, trachea midline, no adenopathy, thyroid symmetric, not enlarged and no tenderness, skin normal  HEMATOLOGIC/LYMPHATICS:  no cervical lymphadenopathy and no supraclavicular lymphadenopathy  BACK:  Symmetric, no curvature, spinous processes are non-tender on palpation, paraspinous muscles are non-tender on palpation, no costal vertebral tenderness  LUNGS:  No increased work of breathing, good air exchange, clear to auscultation bilaterally, no crackles or wheezing  CARDIOVASCULAR:  Normal apical impulse, regular rate and rhythm, normal S1 and S2, no S3 or S4, and no murmur noted  ABDOMEN: No scars, normal bowel sounds, soft, non-distended, non-tender, no masses palpated, no hepatosplenomegally  CHEST/BREASTS:  Breasts symmetrical, skin without lesion(s), no nipple retraction or dimpling, no nipple discharge, no masses palpated, no axillary or supraclavicular adenopathy  GENITAL/URINARY: Deferred   MUSCULOSKELETAL:  There is no redness, warmth, or swelling of the joints. Full range of motion noted. Motor strength is 5 out of 5 all extremities bilaterally. Tone is normal.  NEUROLOGIC:  Awake, alert, oriented to name, place and time. Cranial nerves II-XII are grossly intact. Motor is 5 out of 5 bilaterally. Cerebellar finger to nose, heel to shin intact. Sensory is intact. Babinski down going, Romberg negative, and gait is normal.  SKIN:  no bruising or bleeding, normal skin color, texture, turgor and no redness, warmth, or swelling    Assessment:  Active Problems:    * No active hospital problems. *  Resolved Problems:    * No resolved hospital problems. *      Plan:  1.  Left cataract extraction with intra ocular lens implant    Electronically signed by Bernardino Zelaya MD on 9/24/21 at 1:34 PM EDT

## 2021-09-27 NOTE — PROGRESS NOTES
The Importance of Shoes and Socks in the Diabetic Patient      In people with diabetes, and especially those with sensory loss, shoes can be a source of injury to the feet. 85% of all diabetes related amputations start with a foot ulcer, which is a sore on the foot. Ill-fitting shoes can be the cause of the initial sore, by rubbing and causing a blister or corn or callus. Follow these rules to help protect your feet.    1.  Do not walk barefoot.  Always protect your feet by wearing shoes.  If you don't like wearing shoes in the house, use a pair of slippers with a sole.     2.   Always wear socks with your shoes.  Socks with acrylic fibers actually keep the feet drier than cotton socks. Wet feet and shoes can lead to fungus infections, bacterial overgrowth, foot odor and skin maceration.  These can be entry points for more serious infection.    3.  Wear the most appropriate type of shoe.  We recommend walking shoes. They do not need to be \"orthopedic shoes,\" a nice walking or running shoe can meet these criteria:  The shoe should be made of soft leather, because these can stretch. The sole should be cushioned, not a thin sole, for better shock absorption. The sole should not be slippery, to help prevent falls.  The back of the shoe, called the heel counter, should be firm. If it collapses, it will not provide good support.  Shoes with laces are better than loafers, as you can adjust the tightness as needed.    4.  Wear shoes that are the correct size.  Have feet measured periodically, because feet and your shoe size can change over time.  Studies have shown that 80% of people wear shoes that are too small for their feet.  It may take some time to get used to your new size, but it is necessary to protect your feet.  The shoe should match the shape of your foot, the width of your feet and the height of your foot.  Shop later in the day, because feet swell throughout the day, especially if you have heart disease and  Thora fluid on L side does not need sent for labwork, per Dr. Giles Skill kidney problems.   Have shoes fitted with the socks you’ll be wearing with those specific shoes. That way you’ll know they will fit properly.   The distance between your longest toe and the tip of the shoe should be about one finger's width, so you have the right amount of space to fit your feet.  An easy way to check the size and shape of the shoe is to remove the insole that comes inside the shoe. Stand on the insole in your stocking feet.  If your foot is hanging off the edge of the insole, the shoe is not the appropriate shape or size.    5.  Shoes should be comfortable when first tried on, they should not need to be \"broken in\" to be comfortable.  When starting with new shoes, start wearing them gradually.  Wear them for one to two hours, then check your feet for any blisters, red spots or areas that feel warm.  These can indicate that the shoe is rubbing these spots and could cause an ulcer. Wear them three to four hours the next day, and so on, building up to wearing them all day.    6.  Replace shoes when worn.  Look at the sole. If it is worn down on one side, you are walking on an angle and this can lead to foot and leg pain. If the treads on the sole are worn, you could be at risk of a fall. If the inner lining is worn, there could be friction, creating blisters or sores.    7.  If possible alternate daily between two pairs of shoes. This allows any moisture in the shoe to dry out and changes the pressure points of the shoe on your foot.    8.  Always check inside your shoes for any foreign objects before wearing them.      Diabetes: Shoe-Fitting Tips    ? When buying shoes, make sure they are comfortable from the start and have enough room for your toes.     ? General footwear recommendations: broad and square toe box, laces with 3 or 4 eyes per side, padded tongue, & quality lightweight materials  ? Don't buy shoes with pointed toes or high heels. They put too much pressure on your toes.  ? Re-measure  feet each time buying shoes. Have your foot measured when you are standing.  ? Shop late in the day.  ? Size varies depending on . Always try more than one size to find the best fit. Size must be able to accommodate a cushioned insole.  ? Try on both shoes and walk around to be sure both shoes are comfortable. Fit to the larger foot.  ? Allow at least a thumb width of space at the end of your longest toe in the shoes you select. Make sure you can wiggle your toes.   ? Try the shoes on with the type of socks you will wear.  ? Choose leather uppers, a stiff heel, inside cushioning, and flexibility for the ball of the foot.  ? Ideal heel- ¾-inch or less, outer sole should be made of soft material, laces or Velcro closure.  ? For serious foot problems, you will need a shoe that is specially molded to your foot.  ? Check proper fit with health care professional.        Diabetic Footwear Checklist  Yes No     Is the heel of your shoe less than 2.5cm (1”)?  As the pressure of your heel increases, the pressure under the ball of your foot becomes greater. Increased pressure can lead to callus and ulceration.     Does the shoe have laces, mayda, or elastic to hold it onto your foot?  If you wear slip on shoes with no restraining mechanism, your toes must curl up to hold the shoes on. This can cause the tops of your toes to rub on your shoes leading to corns and calluses. Secondly, the muscles in your feet do not function as they should to help you walk; instead, they are being used less efficiently to hold your shoes on.     Do you have 1cm (approx. thumb nail length of space between your longest toe and the end of your shoe when standing?  This is the best guide for the length of the shoe, as different manufacturers create shoes which are different sizes. Your toes should not touch the end of the shoe as this is likely to cause injury to the toes and place pressure on the toenails.      Do your shoes have a  well-padded sole?  Shoes should have supportive, but cushioned sole to absorb any shock and reduce pressure under the feet.     Are your shoes made from material which breathes?  A warm, moist environment can harbor organisms such as those which cause fungal infections.     Do your shoes protect your feet from injury?  The main function of footwear is protection from the environment. Ensure your shoes are able to prevent entry of foreign objects which can injure the foot. If you have diabetes, a closed toe is essential to prevent injury to the foot.     Is the heel counter of your shoe firm?  Hold the sides of the heel of your shoe between the thumb and forefinger and try to push them together. If the heel compresses, it is too soft to give your foot support. The heel counter provides as much of the support of the shoe and must be firm to press.     Are your shoes the same shape as your feet?  Many shoes have pointed toes and cause friction over the tops of the toes which can lead to corns, callus, and ulceration. If you can see the outline of your toes imprinted on your shoes, then the shoe is probably the wrong shape for the foot.   Scoring  If you answered “No” to any of the above questions, your footwear is probably not protecting and supporting your foot as it should be.

## 2021-10-30 DIAGNOSIS — I50.20 HFREF (HEART FAILURE WITH REDUCED EJECTION FRACTION) (HCC): ICD-10-CM

## 2021-11-01 RX ORDER — DAPAGLIFLOZIN 10 MG/1
TABLET, FILM COATED ORAL
Qty: 30 TABLET | Refills: 6 | Status: SHIPPED | OUTPATIENT
Start: 2021-11-01

## 2021-11-08 ENCOUNTER — ANESTHESIA EVENT (OUTPATIENT)
Dept: OPERATING ROOM | Age: 81
End: 2021-11-08
Payer: MEDICARE

## 2021-11-08 NOTE — ANESTHESIA PRE PROCEDURE
times daily (Patient taking differently: Take 25 mg by mouth 2 times daily AM) 180 tablet 3    sacubitril-valsartan (ENTRESTO)  MG per tablet Take 1 tablet by mouth 2 times daily (Patient taking differently: Take 1 tablet by mouth 2 times daily AM) 60 tablet 11    furosemide (LASIX) 20 MG tablet Take 1 tablet by mouth daily 60 tablet 3    allopurinol (ZYLOPRIM) 100 MG tablet Take 100 mg by mouth daily 5/19/2021 taking 300mg allopurinol  Daily (flair up of gout)      FARXIGA 10 MG tablet TAKE 1 TABLET BY MOUTH EVERY MORNING 30 tablet 6       Allergies:  No Known Allergies    Problem List:    Patient Active Problem List   Diagnosis Code    Right cataract H26.9    Pleural effusion J90    CAD in native artery I25.10    Acute on chronic systolic (congestive) heart failure (HCC) I50.23    HIV antibody positive (HCC) Z21    Nonischemic cardiomyopathy ([facility] I42.8       Past Medical History:        Diagnosis Date    Acute on chronic systolic (congestive) heart failure ([facility] 2/9/2021    CAD in native artery 2/9/2021    Gout     Hypertension     Torn rotator cuff 2008?     left       Past Surgical History:        Procedure Laterality Date    CARDIAC CATHETERIZATION  02/09/2021    Dr. Nadya Wooten Right 11/02/2016    COLONOSCOPY  2001    neg    FRACTURE SURGERY Left 1958    thumb    TONSILLECTOMY      UPPER GASTROINTESTINAL ENDOSCOPY  2001    small hiatal hernia    UPPER GASTROINTESTINAL ENDOSCOPY N/A 4/17/2019    EGD DILATION SAVORY performed by Yesica Zamora MD at [address]  4/17/2019    EGD BIOPSY performed by Yesica Zamora MD at [address] [facility]  4/17/2019    EGD POLYP SNARE performed by Yesica Zamora MD at [address] History:    Social History     Tobacco Use    Smoking status: Never Smoker    Smokeless tobacco: Never Used    Tobacco comment: smoked cigars 40 years ago on occasion   Substance Use Topics    Alcohol use: Not Currently                                Counseling given: Not Answered  Comment: smoked cigars 40 years ago on occasion      Vital Signs (Current):   Vitals:    09/22/21 0916 11/02/21 1305   Weight: 165 lb (74.8 kg) 165 lb (74.8 kg)   Height: 5' 7\" (1.702 m) 5' 7\" (1.702 m)                                              BP Readings from Last 3 Encounters:   05/10/21 (!) 110/56   03/04/21 (!) 120/52   02/25/21 128/76       NPO Status:                                                                                 BMI:   Wt Readings from Last 3 Encounters:   05/10/21 168 lb (76.2 kg)   03/04/21 155 lb (70.3 kg)   02/18/21 149 lb 9.6 oz (67.9 kg)     Body mass index is 25.84 kg/m². CBC:   Lab Results   Component Value Date    WBC 6.4 04/26/2021    RBC 3.57 04/26/2021    HGB 11.3 04/26/2021    HCT 34.7 04/26/2021    MCV 97.2 04/26/2021    RDW 14.0 04/26/2021     04/26/2021       CMP:   Lab Results   Component Value Date     09/21/2021    K 4.4 09/21/2021    K 3.5 02/11/2021     09/21/2021    CO2 28 09/21/2021    BUN 18 09/21/2021    CREATININE 1.1 09/21/2021    GFRAA >60 09/21/2021    LABGLOM >60 09/21/2021    GLUCOSE 108 09/21/2021    GLUCOSE 85 04/01/2011    PROT 6.4 09/21/2021    CALCIUM 9.2 09/21/2021    BILITOT 0.3 09/21/2021    ALKPHOS 98 09/21/2021    AST 15 09/21/2021    ALT 13 09/21/2021       POC Tests: No results for input(s): POCGLU, POCNA, POCK, POCCL, POCBUN, POCHEMO, POCHCT in the last 72 hours.     Coags:   Lab Results   Component Value Date    PROTIME 14.4 02/08/2021    INR 1.3 02/08/2021       HCG (If Applicable): No results found for: PREGTESTUR, PREGSERUM, HCG, HCGQUANT     ABGs: No results found for: PHART, PO2ART, LZT1MTB, CAJ7TVJ, BEART, T3JEAPIL     Type & Screen (If Applicable):  No results found for: LABABO, LABRH    Drug/Infectious Status (If Applicable):  No results found for: HIV, HEPCAB    COVID-19 Screening (If Applicable):   Lab Results   Component Value Date    COVID19 Not Detected 02/08/2021           Anesthesia Evaluation  Patient summary reviewed no history of anesthetic complications:   Airway: Mallampati: II  TM distance: >3 FB     Mouth opening: > = 3 FB Dental: normal exam         Pulmonary:Negative Pulmonary ROS breath sounds clear to auscultation                             Cardiovascular:    (+) hypertension:, CAD:, CHF: systolic,         Rhythm: regular  Rate: normal                 ROS comment:   Echo 6/2021:  Summary   Limited Echo for LV EF - Definity Echo contrast used. Left ventricular chamber dilated at 7.3cm. Moderate global hypokinesis, LF EF 35-40%. Mild left ventricular concentric hypertrophy noted. Mild mitral regurgitation. No mitral valve prolapse. No evidence of pericardial effusion. No recent comparison study available. Neuro/Psych:   Negative Neuro/Psych ROS              GI/Hepatic/Renal: Neg GI/Hepatic/Renal ROS            Endo/Other:                      ROS comment: HIV  Abdominal:             Vascular: negative vascular ROS. Other Findings:           Anesthesia Plan      MAC     ASA 3       Induction: intravenous. History, data and pertinent studies from chart review. Above represents information available via the shared medical record including previous anesthetic, medication and allergy history. Confirmation of above and final disposition per DOS anesthesiologist.        Reynaldo Gonzalez MD   11/8/2021      --------DOS anesthesiologist addendum-------  Patient seen and evaluated. Plan for MAC anesthetic discussed with patient. All patient's questions were answered to his satisfaction. Patient consents to and agrees to [facility] anesthetic. His LVEF improved from 15-20% in 2/2021 to 35-40% in 6/2021. Patient states he plays racquetball.       Viet Macdonald MD  11/9/21

## 2021-11-08 NOTE — H&P
History and Physical    Patient's Name/Date of Birth: Guadalupe Reed / 1940 (89 y.o.)    Date: November 8, 2021     Chief Complaint: Decreased vision of the left eye    HPI: Mature left cataract with decreased vision. Risks and complications as well as options and benefits were discussed with the patient and he has elected to proceed with left cataract extraction with intra ocular lens implant. Past Medical History:   Diagnosis Date    Acute on chronic systolic (congestive) heart failure ([address] 2/9/2021    CAD in native artery 2/9/2021    Gout     Hypertension     Torn rotator cuff 2008? left       Past Surgical History:   Procedure Laterality Date    CARDIAC CATHETERIZATION  02/09/2021    Dr. Alejandra Garcia Right 11/02/2016    COLONOSCOPY  2001    neg    FRACTURE SURGERY Left 1958    thumb    TONSILLECTOMY      UPPER GASTROINTESTINAL ENDOSCOPY  2001    small hiatal hernia    UPPER GASTROINTESTINAL ENDOSCOPY N/A 4/17/2019    EGD DILATION SAVORY performed by Martha Silva MD at [place] [address]  4/17/2019    EGD BIOPSY performed by Martha Silva MD at [place] [address]  4/17/2019    EGD POLYP SNARE performed by Martha Silva MD at [place] [id number]       Prior to Admission medications    Medication Sig Start Date End Date Taking?  Authorizing Provider   ELIQUIS 5 MG TABS tablet TAKE 1 TABLET BY MOUTH 2 TIMES DAILY 8/30/21  Yes Parvin Abad MD   spironolactone (ALDACTONE) 25 MG tablet Take 0.5 tablets by mouth daily  Patient taking differently: Take 25 mg by mouth daily  5/6/21  Yes JESUS Gerber - CNS   metoprolol succinate (TOPROL XL) 25 MG extended release tablet Take 1 tablet by mouth 2 times daily  Patient taking differently: Take 25 mg by mouth 2 times daily AM 4/30/21  Yes Parvin Abad MD   sacubitril-valsartan (ENTRESTO)  MG per tablet Take 1 tablet by mouth 2 and Family: Not on file    Attends [employer] Services: Not on file    Active Member of Clubs or Organizations: Not on file    Attends Club or Organization Meetings: Not on file    Marital Status: Not on file   Intimate Partner Violence:     Fear of Current or Ex-Partner: Not on file    Emotionally Abused: Not on file    Physically Abused: Not on file    Sexually Abused: Not on file   Housing Stability:     Unable to Pay for Housing in the Last Year: Not on file    Number of [place] in the Last Year: Not on file    Unstable Housing in the Last Year: Not on file       Review of Systems:   CONSTITUTIONAL: Oriented to person, place and time   EYES:  Mature left cataract with decreased vision effecting reading,driving and all routine home activities. Visual acuity is 20/70  HEENT:  negative  RESPIRATORY:  negative  CARDIOVASCULAR:  negative  GASTROINTESTINAL:  negative  GENITOURINARY:  negative  INTEGUMENT/BREAST:  negative  HEMATOLOGIC/LYMPHATIC:  negative  ALLERGIC/IMMUNOLOGIC:  negative  ENDOCRINE:  negative  MUSCULOSKELETAL:  negative  NEUROLOGICAL:  negative  BEHAVIOR/PSYCH:  negative    Physical Exam:  Vitals:    09/22/21 0916 11/02/21 1305   Weight: 165 lb (74.8 kg) 165 lb (74.8 kg)   Height: 5' 7\" (1.702 m) 5' 7\" (1.702 m)       CONSTITUTIONAL:  awake, alert, cooperative, no apparent distress, and appears stated age  EYES:  Lids and lashes normal, pupils equal, round and reactive to light, extra ocular muscles intact, sclera clear, conjunctiva normal  ENT:  Normocephalic, without obvious abnormality, atraumatic, sinuses nontender on palpation, external ears without lesions, oral pharynx with moist mucus membranes, tonsils without erythema or exudates, gums normal and good dentition.   NECK:  Supple, symmetrical, trachea midline, no adenopathy, thyroid symmetric, not enlarged and no tenderness, skin normal  HEMATOLOGIC/LYMPHATICS:  no cervical lymphadenopathy and no supraclavicular lymphadenopathy  BACK:  Symmetric, no curvature, spinous processes are non-tender on palpation, paraspinous muscles are non-tender on palpation, no costal vertebral tenderness  LUNGS:  No increased work of breathing, good air exchange, clear to auscultation bilaterally, no crackles or wheezing  CARDIOVASCULAR:  Normal apical impulse, regular rate and rhythm, normal S1 and S2, no S3 or S4, and no murmur noted  ABDOMEN:  No scars, normal bowel sounds, soft, non-distended, non-tender, no masses palpated, no hepatosplenomegally  CHEST/BREASTS:  Breasts symmetrical, skin without lesion(s), no nipple retraction or dimpling, no nipple discharge, no masses palpated, no axillary or supraclavicular adenopathy  GENITAL/URINARY: Deferred   MUSCULOSKELETAL:  There is no redness, warmth, or swelling of the joints. Full range of motion noted. Motor strength is 5 out of 5 all extremities bilaterally. Tone is normal.  NEUROLOGIC:  Awake, alert, oriented to name, place and time. Cranial nerves II-XII are grossly intact. Motor is 5 out of 5 bilaterally. Cerebellar finger to nose, heel to shin intact. Sensory is intact. Babinski down going, Romberg negative, and gait is normal.  SKIN:  no bruising or bleeding, normal skin color, texture, turgor, no redness, warmth, or swelling, no rashes and no lesions    Assessment:  Active Problems:    * No active hospital problems. *  Resolved Problems:    * No resolved hospital problems. *      Plan:  1.  Left cataract extraction with intra ocular lens implant    Electronically signed by Bo Johnston MD on 11/8/21 at 3:10 PM EST

## 2021-11-09 ENCOUNTER — ANESTHESIA (OUTPATIENT)
Dept: OPERATING ROOM | Age: 81
End: 2021-11-09
Payer: MEDICARE

## 2021-11-09 ENCOUNTER — HOSPITAL ENCOUNTER (OUTPATIENT)
Age: 81
Setting detail: OUTPATIENT SURGERY
Discharge: HOME | End: 2021-11-09
Attending: OPHTHALMOLOGY | Admitting: OPHTHALMOLOGY
Payer: MEDICARE

## 2021-11-09 VITALS
DIASTOLIC BLOOD PRESSURE: 53 MMHG | TEMPERATURE: 98 F | SYSTOLIC BLOOD PRESSURE: 136 MMHG | HEIGHT: 67 IN | HEART RATE: 58 BPM | WEIGHT: 177 LBS | RESPIRATION RATE: 18 BRPM | BODY MASS INDEX: 27.78 KG/M2 | OXYGEN SATURATION: 98 %

## 2021-11-09 VITALS
SYSTOLIC BLOOD PRESSURE: 123 MMHG | OXYGEN SATURATION: 96 % | RESPIRATION RATE: 6 BRPM | DIASTOLIC BLOOD PRESSURE: 52 MMHG

## 2021-11-09 PROBLEM — H26.9 LEFT CATARACT: Status: ACTIVE | Noted: 2021-11-09

## 2021-11-09 PROCEDURE — 7100000010 HC PHASE II RECOVERY - FIRST 15 MIN: Performed by: OPHTHALMOLOGY

## 2021-11-09 PROCEDURE — V2632 POST CHMBR INTRAOCULAR LENS: HCPCS | Performed by: OPHTHALMOLOGY

## 2021-11-09 PROCEDURE — 2709999900 HC NON-CHARGEABLE SUPPLY: Performed by: OPHTHALMOLOGY

## 2021-11-09 PROCEDURE — 2720000010 HC SURG SUPPLY STERILE: Performed by: OPHTHALMOLOGY

## 2021-11-09 PROCEDURE — 2500000003 HC RX 250 WO HCPCS: Performed by: NURSE ANESTHETIST, CERTIFIED REGISTERED

## 2021-11-09 PROCEDURE — 2500000003 HC RX 250 WO HCPCS: Performed by: OPHTHALMOLOGY

## 2021-11-09 PROCEDURE — 6370000000 HC RX 637 (ALT 250 FOR IP): Performed by: OPHTHALMOLOGY

## 2021-11-09 PROCEDURE — 6360000002 HC RX W HCPCS: Performed by: NURSE ANESTHETIST, CERTIFIED REGISTERED

## 2021-11-09 PROCEDURE — 7100000011 HC PHASE II RECOVERY - ADDTL 15 MIN: Performed by: OPHTHALMOLOGY

## 2021-11-09 PROCEDURE — 3600000003 HC SURGERY LEVEL 3 BASE: Performed by: OPHTHALMOLOGY

## 2021-11-09 PROCEDURE — 2580000003 HC RX 258: Performed by: ANESTHESIOLOGY

## 2021-11-09 PROCEDURE — 3700000000 HC ANESTHESIA ATTENDED CARE: Performed by: OPHTHALMOLOGY

## 2021-11-09 DEVICE — LENS INTOCU +19.5 DIOPT A CONSTANT 118.8 L13MM DIA6MM 0DEG: Type: IMPLANTABLE DEVICE | Site: EYE | Status: FUNCTIONAL

## 2021-11-09 RX ORDER — BALANCED SALT SOLUTION 6.4; .75; .48; .3; 3.9; 1.7 MG/ML; MG/ML; MG/ML; MG/ML; MG/ML; MG/ML
SOLUTION OPHTHALMIC PRN
Status: DISCONTINUED | OUTPATIENT
Start: 2021-11-09 | End: 2021-11-09 | Stop reason: ALTCHOICE

## 2021-11-09 RX ORDER — MIDAZOLAM HYDROCHLORIDE 1 MG/ML
INJECTION INTRAMUSCULAR; INTRAVENOUS PRN
Status: DISCONTINUED | OUTPATIENT
Start: 2021-11-09 | End: 2021-11-09 | Stop reason: SDUPTHER

## 2021-11-09 RX ORDER — ALFENTANIL HYDROCHLORIDE 500 UG/ML
INJECTION INTRAVENOUS PRN
Status: DISCONTINUED | OUTPATIENT
Start: 2021-11-09 | End: 2021-11-09 | Stop reason: SDUPTHER

## 2021-11-09 RX ORDER — TETRACAINE HYDROCHLORIDE 5 MG/ML
1 SOLUTION OPHTHALMIC ONCE
Status: COMPLETED | OUTPATIENT
Start: 2021-11-09 | End: 2021-11-09

## 2021-11-09 RX ORDER — CYCLOPENTOLATE HYDROCHLORIDE 10 MG/ML
1 SOLUTION/ DROPS OPHTHALMIC
Status: COMPLETED | OUTPATIENT
Start: 2021-11-09 | End: 2021-11-09

## 2021-11-09 RX ORDER — FLURBIPROFEN SODIUM 0.3 MG/ML
1 SOLUTION/ DROPS OPHTHALMIC
Status: COMPLETED | OUTPATIENT
Start: 2021-11-09 | End: 2021-11-09

## 2021-11-09 RX ORDER — PHENYLEPHRINE HCL 2.5 %
1 DROPS OPHTHALMIC (EYE)
Status: COMPLETED | OUTPATIENT
Start: 2021-11-09 | End: 2021-11-09

## 2021-11-09 RX ORDER — SODIUM CHLORIDE, SODIUM LACTATE, POTASSIUM CHLORIDE, CALCIUM CHLORIDE 600; 310; 30; 20 MG/100ML; MG/100ML; MG/100ML; MG/100ML
INJECTION, SOLUTION INTRAVENOUS CONTINUOUS
Status: DISCONTINUED | OUTPATIENT
Start: 2021-11-09 | End: 2021-11-09 | Stop reason: HOSPADM

## 2021-11-09 RX ORDER — PHENYLEPHRINE HYDROCHLORIDE 100 MG/ML
1 SOLUTION/ DROPS OPHTHALMIC PRN
Status: DISCONTINUED | OUTPATIENT
Start: 2021-11-09 | End: 2021-11-09 | Stop reason: HOSPADM

## 2021-11-09 RX ADMIN — FLURBIPROFEN SODIUM 1 DROP: 0.3 SOLUTION/ DROPS OPHTHALMIC at 07:15

## 2021-11-09 RX ADMIN — PHENYLEPHRINE HYDROCHLORIDE 1 DROP: 25 SOLUTION/ DROPS OPHTHALMIC at 07:15

## 2021-11-09 RX ADMIN — CYCLOPENTOLATE HYDROCHLORIDE 1 DROP: 10 SOLUTION/ DROPS OPHTHALMIC at 07:15

## 2021-11-09 RX ADMIN — FLURBIPROFEN SODIUM 1 DROP: 0.3 SOLUTION/ DROPS OPHTHALMIC at 07:20

## 2021-11-09 RX ADMIN — SODIUM CHLORIDE, POTASSIUM CHLORIDE, SODIUM LACTATE AND CALCIUM CHLORIDE: 600; 310; 30; 20 INJECTION, SOLUTION INTRAVENOUS at 07:13

## 2021-11-09 RX ADMIN — ALFENTANIL HYDROCHLORIDE 250 MCG: 500 INJECTION INTRAVENOUS at 08:09

## 2021-11-09 RX ADMIN — TETRACAINE HYDROCHLORIDE 1 DROP: 25 LIQUID OPHTHALMIC at 07:40

## 2021-11-09 RX ADMIN — MIDAZOLAM 1 MG: 1 INJECTION INTRAMUSCULAR; INTRAVENOUS at 08:05

## 2021-11-09 RX ADMIN — ALFENTANIL HYDROCHLORIDE 250 MCG: 500 INJECTION INTRAVENOUS at 08:06

## 2021-11-09 RX ADMIN — PHENYLEPHRINE HYDROCHLORIDE 1 DROP: 25 SOLUTION/ DROPS OPHTHALMIC at 07:28

## 2021-11-09 RX ADMIN — FLURBIPROFEN SODIUM 1 DROP: 0.3 SOLUTION/ DROPS OPHTHALMIC at 07:28

## 2021-11-09 RX ADMIN — CYCLOPENTOLATE HYDROCHLORIDE 1 DROP: 10 SOLUTION/ DROPS OPHTHALMIC at 07:20

## 2021-11-09 RX ADMIN — PHENYLEPHRINE HYDROCHLORIDE 1 DROP: 25 SOLUTION/ DROPS OPHTHALMIC at 07:20

## 2021-11-09 RX ADMIN — CYCLOPENTOLATE HYDROCHLORIDE 1 DROP: 10 SOLUTION/ DROPS OPHTHALMIC at 07:28

## 2021-11-09 ASSESSMENT — PAIN SCALES - GENERAL
PAINLEVEL_OUTOF10: 0

## 2021-11-09 ASSESSMENT — PAIN - FUNCTIONAL ASSESSMENT: PAIN_FUNCTIONAL_ASSESSMENT: 0-10

## 2021-11-09 NOTE — OP NOTE
PREOPERATIVE DIAGNOSIS:  Complex Left Cataract    POSTOPERATIVE DIAGNOSIS:  Complex Left Cataract    PROCEDURE:  Left phacoemulsification of complex cataract with intraocular lens implant. ANESTHESIA:  Local Mac    ESTIMATED BLOOD LOSS:  Minimal    COMPLICATIONS:  None    DESCRIPTION OF PROCEDURE:  The patient was brought into the operating room. The operative eye was marked, which was the left eye. The left eye was then prepped with a full-strength Betadine preparation. The periorbital area was copiously washed with Betadine. The lashes were washed with Betadine. Dilute Betadine was then also put in the inferior and superior fornices and left in place for approximately a minute or 2. This was then irrigated with sterile water. The face was then wiped and the preparation was repeated 1 more time. The drape was then placed over the operative eye with the sticky adhesive placed at the lid margins so that it draped the lashes out of the operative field superiorly and inferiorly, as well as isolated the meibomian glands behind the drape. This cleared the operative field of any meibomian gland secretions and eyelashes. Next, a lid speculum was positioned in the left eye. A super sharp blade was used to make a side-port incision at the 2 o'clock position. A clear corneal incision was fashioned over the 12 o;clock meridian with a 2.3mm keratome at the limbus. After the superior incision was made, VisionBlue was instilled in the anterior segment. Viscoat was used to express the VisionBlue out of the eye and stabilize the anterior segment. The VisionBlue stained anterior capsule then had anterior capsulotomy performed with a bent needle cystotome. Hydrodissection was performed by irrigating with balanced salt solution through a syringe underneath the anterior capsular flap to loosen and allow free rotation of the lens nucleus. Phacoemulsification was used and the entire lens nucleus was removed.   The I A unit was instilled in the eye, and the remaining cortex was removed. Healon was used to separate the anterior and posterior capsular flaps. The PCBOO 19.5 diopter lens was then instilled into the capsular bag and dialed to 3 and 9 o'clock positions. Healon was removed from in front of and behind the lens. The lens was found to be well centered, well positioned in the bag and stable. The wound was checked and found to be airtight and watertight. The lid speculum was removed and the drape was removed. The patient was brought to the recovery room in excellent condition, given postoperative instructions, and discharge in excellent condition.      Operative Note      Patient: Joann Cates  YOB: 1940  MRN: [id number]    Date of Procedure: 11/9/2021    Pre-Op Diagnosis: LEFT CATARACT    Post-Op Diagnosis: Same       Procedure(s):  LEFT CATARACT EXTRACTION WITH IOL    Surgeon(s):  Vonnie Andersen MD    Assistant:   * No surgical staff found *    Anesthesia: Monitor Anesthesia Care    Estimated Blood Loss (mL): Minimal    Complications: None    Specimens:   * No specimens in log *    Implants:  * No implants in log *      Drains: * No LDAs found *    Findings: Left cataract    Detailed Description of Procedure:   Left cataract extraction with intra ocular lens implant    Electronically signed by Justin Cristobal MD on 11/9/2021 at 8:04 AM

## 2021-11-09 NOTE — H&P
Update History & Physical    The patient's History and Physical of 11 / 8 / 2021 was reviewed with the patient and there were no significant changes. I examined the patient and there were no significant changes from the previous History and Physical.    Plan: The risk, benefits, expected outcome, and alternative to the recommended procedure have been discussed with the patient. Patient understands and wants to proceed with the procedure.     Electronically signed by Jh Deleon MD on 11/9/21 at 8:03 AM EST

## 2021-11-09 NOTE — ANESTHESIA POSTPROCEDURE EVALUATION
Department of Anesthesiology  Postprocedure Note    Patient: Dirk Randhawa  MRN: [id number]  YOB: 1940  Date of evaluation: 11/9/2021  Time:  10:46 AM     Procedure Summary     Date: 11/09/21 Room / Location: [address] [place] [address]    Anesthesia Start: 0805 Anesthesia Stop: 8391    Procedure: LEFT CATARACT EXTRACTION WITH IOL (Left ) Diagnosis: (LEFT CATARACT)    Surgeons: Ramandeep Corral MD Responsible Provider: Luna Meek MD    Anesthesia Type: MAC ASA Status: 3          Anesthesia Type: MAC    Joo Phase I: Joo Score: 10    Joo Phase II: Joo Score: 10    Last vitals: Reviewed and per EMR flowsheets.        Anesthesia Post Evaluation    Patient location during evaluation: PACU  Patient participation: complete - patient participated  Level of consciousness: awake and alert  Airway patency: patent  Nausea & Vomiting: no nausea and no vomiting  Complications: no  Cardiovascular status: hemodynamically stable  Respiratory status: acceptable  Hydration status: euvolemic

## 2021-11-12 DIAGNOSIS — I50.20 HFREF (HEART FAILURE WITH REDUCED EJECTION FRACTION) (HCC): ICD-10-CM

## 2021-11-16 RX ORDER — SPIRONOLACTONE 25 MG/1
25 TABLET ORAL DAILY
Qty: 90 TABLET | Refills: 2 | Status: SHIPPED | OUTPATIENT
Start: 2021-11-16

## 2021-11-18 RX ORDER — FUROSEMIDE 20 MG/1
TABLET ORAL
Qty: 90 TABLET | Refills: 3 | Status: SHIPPED | OUTPATIENT
Start: 2021-11-18

## 2022-01-10 ENCOUNTER — HOSPITAL ENCOUNTER (OUTPATIENT)
Dept: NON INVASIVE DIAGNOSTICS | Age: 82
Discharge: HOME | End: 2022-01-10
Payer: MEDICARE

## 2022-01-10 LAB
LV EF: 40 %
LVEF MODALITY: NORMAL

## 2022-01-10 PROCEDURE — 93306 TTE W/DOPPLER COMPLETE: CPT

## 2022-02-14 DIAGNOSIS — I50.20 HFREF (HEART FAILURE WITH REDUCED EJECTION FRACTION) (HCC): ICD-10-CM

## 2022-02-14 RX ORDER — SACUBITRIL AND VALSARTAN 97; 103 MG/1; MG/1
1 TABLET, FILM COATED ORAL 2 TIMES DAILY
Qty: 180 TABLET | Refills: 2 | Status: SHIPPED | OUTPATIENT
Start: 2022-02-14

## 2022-02-28 RX ORDER — APIXABAN 5 MG/1
TABLET, FILM COATED ORAL
Qty: 180 TABLET | Refills: 1 | Status: SHIPPED | OUTPATIENT
Start: 2022-02-28

## 2022-05-20 RX ORDER — METOPROLOL SUCCINATE 25 MG/1
25 TABLET, EXTENDED RELEASE ORAL 2 TIMES DAILY
Qty: 180 TABLET | Refills: 3 | OUTPATIENT
Start: 2022-05-20

## 2022-06-10 RX ORDER — COLCHICINE 0.6 MG/1
0.6 TABLET ORAL 2 TIMES DAILY
COMMUNITY

## 2022-07-20 ENCOUNTER — TELEPHONE (OUTPATIENT)
Dept: PHARMACY | Facility: CLINIC | Age: 82
End: 2022-07-20

## 2022-07-25 RX ORDER — ALLOPURINOL 300 MG/1
TABLET ORAL
Qty: 90 TABLET | Refills: 3 | OUTPATIENT
Start: 2022-07-25

## 2022-07-26 ENCOUNTER — TELEPHONE (OUTPATIENT)
Dept: OTHER | Facility: CLINIC | Age: 82
End: 2022-07-26

## 2022-07-26 NOTE — TELEPHONE ENCOUNTER
Lakisha Diamond was contacted by the [address] [place] [address] team to establish with a primary care provider. Records indicate they have not been seen in over a year. Need to verify PCP and schedule appointment. 2nd attempt-Left Vm for pt to return call to this nurse. 3rd attempt-Left VM for pt to return call to this nurse.      Kylie Cortez, [facility]   [phone]

## 2022-08-22 RX ORDER — APIXABAN 5 MG/1
TABLET, FILM COATED ORAL
Qty: 180 TABLET | Refills: 1 | OUTPATIENT
Start: 2022-08-22

## 2022-10-24 RX ORDER — APIXABAN 5 MG/1
TABLET, FILM COATED ORAL
Qty: 180 TABLET | Refills: 1 | OUTPATIENT
Start: 2022-10-24

## 2022-10-27 RX ORDER — APIXABAN 5 MG/1
TABLET, FILM COATED ORAL
Qty: 180 TABLET | Refills: 1 | OUTPATIENT
Start: 2022-10-27

## 2022-11-19 DIAGNOSIS — I50.20 HFREF (HEART FAILURE WITH REDUCED EJECTION FRACTION) (HCC): ICD-10-CM

## 2022-11-21 RX ORDER — SACUBITRIL AND VALSARTAN 97; 103 MG/1; MG/1
TABLET, FILM COATED ORAL
Qty: 180 TABLET | Refills: 2 | Status: SHIPPED | OUTPATIENT
Start: 2022-11-21

## 2022-11-21 RX ORDER — FUROSEMIDE 20 MG/1
TABLET ORAL
Qty: 90 TABLET | Refills: 2 | Status: SHIPPED | OUTPATIENT
Start: 2022-11-21

## 2023-08-07 ENCOUNTER — OFFICE VISIT (OUTPATIENT)
Dept: PRIMARY CARE CLINIC | Age: 83
End: 2023-08-07
Payer: MEDICARE

## 2023-08-07 VITALS
DIASTOLIC BLOOD PRESSURE: 60 MMHG | TEMPERATURE: 97.2 F | HEART RATE: 66 BPM | BODY MASS INDEX: 25.98 KG/M2 | OXYGEN SATURATION: 99 % | WEIGHT: 165.5 LBS | SYSTOLIC BLOOD PRESSURE: 138 MMHG | HEIGHT: 67 IN

## 2023-08-07 DIAGNOSIS — M79.642 PAIN IN LEFT HAND: ICD-10-CM

## 2023-08-07 DIAGNOSIS — M79.643 TENDERNESS OF ANATOMICAL SNUFFBOX: Primary | ICD-10-CM

## 2023-08-07 PROCEDURE — 99213 OFFICE O/P EST LOW 20 MIN: CPT

## 2023-08-07 PROCEDURE — 1123F ACP DISCUSS/DSCN MKR DOCD: CPT

## 2023-08-07 RX ORDER — METHYLPREDNISOLONE 4 MG/1
TABLET ORAL
Qty: 1 KIT | Refills: 0 | Status: SHIPPED | OUTPATIENT
Start: 2023-08-07 | End: 2023-08-13

## 2023-08-07 SDOH — ECONOMIC STABILITY: INCOME INSECURITY: HOW HARD IS IT FOR YOU TO PAY FOR THE VERY BASICS LIKE FOOD, HOUSING, MEDICAL CARE, AND HEATING?: NOT HARD AT ALL

## 2023-08-07 SDOH — ECONOMIC STABILITY: FOOD INSECURITY: WITHIN THE PAST 12 MONTHS, YOU WORRIED THAT YOUR FOOD WOULD RUN OUT BEFORE YOU GOT MONEY TO BUY MORE.: NEVER TRUE

## 2023-08-07 SDOH — ECONOMIC STABILITY: FOOD INSECURITY: WITHIN THE PAST 12 MONTHS, THE FOOD YOU BOUGHT JUST DIDN'T LAST AND YOU DIDN'T HAVE MONEY TO GET MORE.: NEVER TRUE

## 2023-08-07 SDOH — ECONOMIC STABILITY: HOUSING INSECURITY
IN THE LAST 12 MONTHS, WAS THERE A TIME WHEN YOU DID NOT HAVE A STEADY PLACE TO SLEEP OR SLEPT IN A SHELTER (INCLUDING NOW)?: NO

## 2023-08-07 ASSESSMENT — PATIENT HEALTH QUESTIONNAIRE - PHQ9
SUM OF ALL RESPONSES TO PHQ9 QUESTIONS 1 & 2: 0
1. LITTLE INTEREST OR PLEASURE IN DOING THINGS: 0
SUM OF ALL RESPONSES TO PHQ QUESTIONS 1-9: 0
2. FEELING DOWN, DEPRESSED OR HOPELESS: 0
SUM OF ALL RESPONSES TO PHQ QUESTIONS 1-9: 0

## 2023-08-07 NOTE — PROGRESS NOTES
pain continues. Advised to continue with Advil. Will add on Medrol Dosepak. No follow-ups on file. Patient may come in sooner if needed for medical concerns. Patient advised to call at any time to cancel, re-schedule, or for any questions/concerns.     Treasure Davalos PA-C    8/7/23  12:58 PM

## 2023-08-08 ASSESSMENT — ENCOUNTER SYMPTOMS
CONSTIPATION: 0
BACK PAIN: 0
VOMITING: 0
SHORTNESS OF BREATH: 0
SORE THROAT: 0
RHINORRHEA: 0
WHEEZING: 0
COUGH: 0
PHOTOPHOBIA: 0
DIARRHEA: 0
ABDOMINAL PAIN: 0

## 2024-12-30 NOTE — TELEPHONE ENCOUNTER
Patient is due to have his BMP level checked - his potassium was high the last time. They state he received his Covid vaccine yesterday and when reading the material there is potassium in the shot. Should they wait to retest labs?   Or is it OK 5 (moderate pain)

## (undated) DEVICE — Device: Brand: DEFENDO VALVE AND CONNECTOR KIT

## (undated) DEVICE — KIT BEDSIDE REVITAL OX 500ML

## (undated) DEVICE — FORCEPS BX L240CM JAW DIA2.8MM L CAP W/ NDL MIC MESH TOOTH

## (undated) DEVICE — CONTAINER SPEC COLL 960ML POLYPR TRIANG GRAD INTAKE/OUTPUT

## (undated) DEVICE — YANKAUER,BULB TIP,W/O VENT,RIGID,STERILE: Brand: MEDLINE

## (undated) DEVICE — ENCORE® LATEX MICRO SIZE 8.5, STERILE LATEX POWDER-FREE SURGICAL GLOVE: Brand: ENCORE

## (undated) DEVICE — 6 X 9  1.75MIL 4-WALL LABGUARD: Brand: MINIGRIP COMMERCIAL LLC

## (undated) DEVICE — SINGLE-USE POLYPECTOMY SNARE: Brand: CAPTIVATOR

## (undated) DEVICE — LUBRICANT SURG JELLY ST BACTER TUBE 4.25OZ

## (undated) DEVICE — PREP TRAY 10X5X2: Brand: MEDLINE INDUSTRIES, INC.

## (undated) DEVICE — BLOCK BITE 60FR CAREGUARD

## (undated) DEVICE — CONTAINER SPEC 480ML CLR POLYSTYR 10% NEUT BUFF FRMLN ZN

## (undated) DEVICE — STERILE POLYISOPRENE POWDER-FREE SURGICAL GLOVES: Brand: PROTEXIS

## (undated) DEVICE — THE DISPOSABLE ROTH NET PLATINUM FOOD BOLUS RETRIEVAL DEVICE IS USED IN THE ENDOSCOPIC RETRIEVAL FOOD BOLUS.: Brand: ROTH NET PLATINUM

## (undated) DEVICE — MASK,FACE,MAXFLUIDPROTECT,SHIELD/ERLPS: Brand: MEDLINE

## (undated) DEVICE — SOLUTION IV IRRIG WATER 1000ML POUR BRL 2F7114

## (undated) DEVICE — SOLUTION IV IRRIG POUR BRL 0.9% SODIUM CHL 2F7124

## (undated) DEVICE — TOWEL,OR,DSP,ST,BLUE,STD,6/PK,12PK/CS: Brand: MEDLINE

## (undated) DEVICE — 3 ML SYRINGE LUER-LOCK TIP: Brand: MONOJECT

## (undated) DEVICE — SURGICAL PREP KIT DRY EYE TY STRL

## (undated) DEVICE — SPONGE GZ 4IN 4IN 4 PLY N WVN AVANT

## (undated) DEVICE — Device

## (undated) DEVICE — SURGICAL PROCEDURE PACK CATRCT LT EYE BASIC CUST ST JOS LF

## (undated) DEVICE — CENTURION ULTRAVIT ANTERIOR VITRECTOMY PROBE: Brand: ALCON, CENTURION, ULTRAVIT

## (undated) DEVICE — KENDALL 450 SERIES MONITORING FOAM ELECTRODE - RECTANGULAR SHAPE ( 3/PK): Brand: KENDALL

## (undated) DEVICE — SOLUTION SCRB 32OZ 7.5% POVIDONE IOD BTL GENTLE EFFECTIVE

## (undated) DEVICE — GOWN ISOLATN REG YEL M WT MULTIPLY SIDETIE LEV 2

## (undated) DEVICE — TUBING, SUCTION, 1/4" X 10', STRAIGHT: Brand: MEDLINE